# Patient Record
Sex: FEMALE | Race: WHITE | Employment: UNEMPLOYED | URBAN - METROPOLITAN AREA
[De-identification: names, ages, dates, MRNs, and addresses within clinical notes are randomized per-mention and may not be internally consistent; named-entity substitution may affect disease eponyms.]

---

## 2017-03-20 ENCOUNTER — OFFICE VISIT (OUTPATIENT)
Dept: OBGYN | Age: 22
End: 2017-03-20
Payer: MEDICARE

## 2017-03-20 VITALS
WEIGHT: 162 LBS | HEIGHT: 62 IN | DIASTOLIC BLOOD PRESSURE: 70 MMHG | SYSTOLIC BLOOD PRESSURE: 110 MMHG | BODY MASS INDEX: 29.81 KG/M2

## 2017-03-20 DIAGNOSIS — N93.8 DUB (DYSFUNCTIONAL UTERINE BLEEDING): Primary | ICD-10-CM

## 2017-03-20 PROCEDURE — 99213 OFFICE O/P EST LOW 20 MIN: CPT | Performed by: ADVANCED PRACTICE MIDWIFE

## 2017-04-21 ENCOUNTER — HOSPITAL ENCOUNTER (OUTPATIENT)
Age: 22
Discharge: HOME | End: 2017-04-21
Payer: MEDICARE

## 2017-04-21 DIAGNOSIS — N93.8 DUB (DYSFUNCTIONAL UTERINE BLEEDING): ICD-10-CM

## 2017-04-21 LAB — PROGESTERONE LEVEL: 0.38 NG/ML

## 2017-04-21 PROCEDURE — 84144 ASSAY OF PROGESTERONE: CPT

## 2017-04-21 PROCEDURE — 36415 COLL VENOUS BLD VENIPUNCTURE: CPT

## 2017-04-25 DIAGNOSIS — N97.9 INFERTILITY, FEMALE: Primary | ICD-10-CM

## 2017-05-06 ENCOUNTER — HOSPITAL ENCOUNTER (OUTPATIENT)
Age: 22
Discharge: HOME | End: 2017-05-06
Payer: MEDICARE

## 2017-05-06 LAB — PROGESTERONE LEVEL: 0.1 NG/ML

## 2017-05-06 PROCEDURE — 84144 ASSAY OF PROGESTERONE: CPT

## 2017-05-06 PROCEDURE — 36415 COLL VENOUS BLD VENIPUNCTURE: CPT

## 2017-08-07 ENCOUNTER — HOSPITAL ENCOUNTER (OUTPATIENT)
Age: 22
Setting detail: SPECIMEN
Discharge: HOME | End: 2017-08-07
Payer: MEDICARE

## 2017-08-07 ENCOUNTER — OFFICE VISIT (OUTPATIENT)
Dept: OBGYN | Age: 22
End: 2017-08-07
Payer: MEDICARE

## 2017-08-07 VITALS
HEIGHT: 66 IN | DIASTOLIC BLOOD PRESSURE: 76 MMHG | WEIGHT: 164 LBS | BODY MASS INDEX: 26.36 KG/M2 | SYSTOLIC BLOOD PRESSURE: 115 MMHG

## 2017-08-07 DIAGNOSIS — N91.2 AMENORRHEA: Primary | ICD-10-CM

## 2017-08-07 DIAGNOSIS — N97.0 ANOVULATORY (DYSFUNCTIONAL UTERINE) BLEEDING: ICD-10-CM

## 2017-08-07 DIAGNOSIS — N91.2 AMENORRHEA: ICD-10-CM

## 2017-08-07 LAB
CONTROL: PRESENT
HCG QUANTITATIVE: <1 IU/L
PREGNANCY TEST URINE, POC: NEGATIVE
PROGESTERONE LEVEL: 8.13 NG/ML

## 2017-08-07 PROCEDURE — 84144 ASSAY OF PROGESTERONE: CPT

## 2017-08-07 PROCEDURE — 36415 COLL VENOUS BLD VENIPUNCTURE: CPT

## 2017-08-07 PROCEDURE — 99213 OFFICE O/P EST LOW 20 MIN: CPT | Performed by: ADVANCED PRACTICE MIDWIFE

## 2017-08-07 PROCEDURE — 81025 URINE PREGNANCY TEST: CPT | Performed by: ADVANCED PRACTICE MIDWIFE

## 2017-08-07 PROCEDURE — 84702 CHORIONIC GONADOTROPIN TEST: CPT

## 2017-08-07 ASSESSMENT — PATIENT HEALTH QUESTIONNAIRE - PHQ9
1. LITTLE INTEREST OR PLEASURE IN DOING THINGS: 0
2. FEELING DOWN, DEPRESSED OR HOPELESS: 0
SUM OF ALL RESPONSES TO PHQ9 QUESTIONS 1 & 2: 0
SUM OF ALL RESPONSES TO PHQ QUESTIONS 1-9: 0

## 2017-08-15 ENCOUNTER — TELEPHONE (OUTPATIENT)
Dept: OBGYN | Age: 22
End: 2017-08-15

## 2017-09-01 ENCOUNTER — HOSPITAL ENCOUNTER (OUTPATIENT)
Age: 22
Discharge: HOME | End: 2017-09-01
Payer: MEDICARE

## 2017-09-01 DIAGNOSIS — N97.0 ANOVULATION: ICD-10-CM

## 2017-09-01 DIAGNOSIS — N97.0 ANOVULATION: Primary | ICD-10-CM

## 2017-09-01 LAB — PROGESTERONE LEVEL: 20.98 NG/ML

## 2017-09-01 PROCEDURE — 36415 COLL VENOUS BLD VENIPUNCTURE: CPT

## 2017-09-01 PROCEDURE — 84144 ASSAY OF PROGESTERONE: CPT

## 2017-09-12 DIAGNOSIS — N97.0 ANOVULATORY (DYSFUNCTIONAL UTERINE) BLEEDING: ICD-10-CM

## 2017-09-25 ENCOUNTER — TELEPHONE (OUTPATIENT)
Dept: OBGYN | Age: 22
End: 2017-09-25

## 2017-09-25 ENCOUNTER — HOSPITAL ENCOUNTER (OUTPATIENT)
Age: 22
Setting detail: SPECIMEN
Discharge: HOME | End: 2017-09-25
Payer: MEDICARE

## 2017-09-25 DIAGNOSIS — N97.0 ANOVULATORY CYCLE: Primary | ICD-10-CM

## 2017-10-03 ENCOUNTER — HOSPITAL ENCOUNTER (OUTPATIENT)
Age: 22
Discharge: HOME | End: 2017-10-03
Payer: MEDICARE

## 2017-10-03 DIAGNOSIS — N97.0 ANOVULATORY CYCLE: ICD-10-CM

## 2017-10-03 LAB — PROGESTERONE LEVEL: 26.05 NG/ML

## 2017-10-03 PROCEDURE — 36415 COLL VENOUS BLD VENIPUNCTURE: CPT

## 2017-10-03 PROCEDURE — 84144 ASSAY OF PROGESTERONE: CPT

## 2017-11-15 ENCOUNTER — TELEPHONE (OUTPATIENT)
Dept: OBGYN | Age: 22
End: 2017-11-15

## 2017-11-22 ENCOUNTER — TELEPHONE (OUTPATIENT)
Dept: OBGYN CLINIC | Age: 22
End: 2017-11-22

## 2017-11-22 DIAGNOSIS — R10.2 PELVIC PAIN AFFECTING PREGNANCY IN FIRST TRIMESTER, ANTEPARTUM: Primary | ICD-10-CM

## 2017-11-22 DIAGNOSIS — O20.9 VAGINAL BLEEDING IN PREGNANCY, FIRST TRIMESTER: ICD-10-CM

## 2017-11-22 DIAGNOSIS — O26.891 PELVIC PAIN AFFECTING PREGNANCY IN FIRST TRIMESTER, ANTEPARTUM: Primary | ICD-10-CM

## 2018-01-08 ENCOUNTER — HOSPITAL ENCOUNTER (OUTPATIENT)
Age: 23
Setting detail: SPECIMEN
Discharge: HOME | End: 2018-01-08
Payer: COMMERCIAL

## 2018-01-08 ENCOUNTER — INITIAL PRENATAL (OUTPATIENT)
Dept: OBGYN | Age: 23
End: 2018-01-08
Payer: COMMERCIAL

## 2018-01-08 VITALS — DIASTOLIC BLOOD PRESSURE: 76 MMHG | SYSTOLIC BLOOD PRESSURE: 110 MMHG | BODY MASS INDEX: 26.31 KG/M2 | WEIGHT: 163 LBS

## 2018-01-08 DIAGNOSIS — Z3A.01 6 WEEKS GESTATION OF PREGNANCY: ICD-10-CM

## 2018-01-08 DIAGNOSIS — O21.9 NAUSEA AND VOMITING IN PREGNANCY: Primary | ICD-10-CM

## 2018-01-08 DIAGNOSIS — O36.80X0 PREGNANCY WITH UNCERTAIN FETAL VIABILITY, SINGLE OR UNSPECIFIED FETUS: ICD-10-CM

## 2018-01-08 DIAGNOSIS — Z34.91 ENCOUNTER FOR SUPERVISION OF NORMAL PREGNANCY IN FIRST TRIMESTER, UNSPECIFIED GRAVIDITY: ICD-10-CM

## 2018-01-08 LAB
ABO/RH: NORMAL
AMPHETAMINE SCREEN URINE: NEGATIVE
ANTIBODY SCREEN: NEGATIVE
BARBITURATE SCREEN URINE: NEGATIVE
BENZODIAZEPINE SCREEN, URINE: NEGATIVE
BUPRENORPHINE URINE: NEGATIVE
CANNABINOID SCREEN URINE: NEGATIVE
COCAINE METABOLITE, URINE: NEGATIVE
MDMA URINE: NORMAL
METHADONE SCREEN, URINE: NEGATIVE
METHAMPHETAMINE, URINE: NEGATIVE
OPIATES, URINE: NEGATIVE
OXYCODONE SCREEN URINE: NEGATIVE
PHENCYCLIDINE, URINE: NEGATIVE
PROPOXYPHENE, URINE: NEGATIVE
TEST INFORMATION: NORMAL
TRICYCLIC ANTIDEPRESSANTS, UR: NEGATIVE

## 2018-01-08 PROCEDURE — 87591 N.GONORRHOEAE DNA AMP PROB: CPT

## 2018-01-08 PROCEDURE — 86762 RUBELLA ANTIBODY: CPT

## 2018-01-08 PROCEDURE — 86850 RBC ANTIBODY SCREEN: CPT

## 2018-01-08 PROCEDURE — 86780 TREPONEMA PALLIDUM: CPT

## 2018-01-08 PROCEDURE — 86803 HEPATITIS C AB TEST: CPT

## 2018-01-08 PROCEDURE — 80306 DRUG TEST PRSMV INSTRMNT: CPT

## 2018-01-08 PROCEDURE — 87340 HEPATITIS B SURFACE AG IA: CPT

## 2018-01-08 PROCEDURE — 76817 TRANSVAGINAL US OBSTETRIC: CPT | Performed by: OBSTETRICS & GYNECOLOGY

## 2018-01-08 PROCEDURE — 87389 HIV-1 AG W/HIV-1&-2 AB AG IA: CPT

## 2018-01-08 PROCEDURE — 87491 CHLMYD TRACH DNA AMP PROBE: CPT

## 2018-01-08 PROCEDURE — 86900 BLOOD TYPING SEROLOGIC ABO: CPT

## 2018-01-08 PROCEDURE — 86901 BLOOD TYPING SEROLOGIC RH(D): CPT

## 2018-01-08 PROCEDURE — 99211 OFF/OP EST MAY X REQ PHY/QHP: CPT | Performed by: ADVANCED PRACTICE MIDWIFE

## 2018-01-08 PROCEDURE — 87086 URINE CULTURE/COLONY COUNT: CPT

## 2018-01-08 PROCEDURE — 85025 COMPLETE CBC W/AUTO DIFF WBC: CPT

## 2018-01-08 RX ORDER — ONDANSETRON 4 MG/1
4 TABLET, FILM COATED ORAL EVERY 8 HOURS PRN
Qty: 20 TABLET | Refills: 2 | Status: ON HOLD | OUTPATIENT
Start: 2018-01-08 | End: 2018-08-10 | Stop reason: HOSPADM

## 2018-01-08 ASSESSMENT — PATIENT HEALTH QUESTIONNAIRE - PHQ9
SUM OF ALL RESPONSES TO PHQ9 QUESTIONS 1 & 2: 0
SUM OF ALL RESPONSES TO PHQ QUESTIONS 1-9: 0
2. FEELING DOWN, DEPRESSED OR HOPELESS: 0
1. LITTLE INTEREST OR PLEASURE IN DOING THINGS: 0

## 2018-01-09 LAB
ABSOLUTE EOS #: 0.1 K/UL (ref 0–0.4)
ABSOLUTE IMMATURE GRANULOCYTE: ABNORMAL K/UL (ref 0–0.3)
ABSOLUTE LYMPH #: 1.6 K/UL (ref 1–4.8)
ABSOLUTE MONO #: 0.6 K/UL (ref 0–1)
BASOPHILS # BLD: 0 % (ref 0–2)
BASOPHILS ABSOLUTE: 0 K/UL (ref 0–0.2)
CULTURE: NORMAL
CULTURE: NORMAL
DIFFERENTIAL TYPE: ABNORMAL
EOSINOPHILS RELATIVE PERCENT: 1 % (ref 0–8)
HCT VFR BLD CALC: 37.1 % (ref 36–46)
HEMOGLOBIN: 12 G/DL (ref 12–16)
HEPATITIS B SURFACE ANTIGEN: NONREACTIVE
HEPATITIS C ANTIBODY: NONREACTIVE
HIV AG/AB: NONREACTIVE
IMMATURE GRANULOCYTES: ABNORMAL %
LYMPHOCYTES # BLD: 15 % (ref 24–44)
Lab: NORMAL
MCH RBC QN AUTO: 27.3 PG (ref 26–34)
MCHC RBC AUTO-ENTMCNC: 32.5 G/DL (ref 31–37)
MCV RBC AUTO: 84.2 FL (ref 80–100)
MONOCYTES # BLD: 6 % (ref 0–12)
PDW BLD-RTO: 14.4 % (ref 12.1–15.2)
PLATELET # BLD: 277 K/UL (ref 140–450)
PLATELET ESTIMATE: ABNORMAL
PMV BLD AUTO: 8.8 FL (ref 6–12)
RBC # BLD: 4.4 M/UL (ref 4–5.2)
RBC # BLD: ABNORMAL 10*6/UL
RUBV IGG SER QL: 139.6 IU/ML
SEG NEUTROPHILS: 78 % (ref 36–66)
SEGMENTED NEUTROPHILS ABSOLUTE COUNT: 8.4 K/UL (ref 1.8–7.7)
SPECIMEN DESCRIPTION: NORMAL
SPECIMEN DESCRIPTION: NORMAL
STATUS: NORMAL
T. PALLIDUM, IGG: NONREACTIVE
WBC # BLD: 10.8 K/UL (ref 3.5–11)
WBC # BLD: ABNORMAL 10*3/UL

## 2018-01-10 LAB
C. TRACHOMATIS DNA ,URINE: NEGATIVE
N. GONORRHOEAE DNA, URINE: NEGATIVE

## 2018-01-22 ENCOUNTER — HOSPITAL ENCOUNTER (OUTPATIENT)
Age: 23
Setting detail: SPECIMEN
Discharge: HOME | End: 2018-01-22
Payer: COMMERCIAL

## 2018-01-22 ENCOUNTER — ROUTINE PRENATAL (OUTPATIENT)
Dept: OBGYN | Age: 23
End: 2018-01-22
Payer: COMMERCIAL

## 2018-01-22 VITALS — BODY MASS INDEX: 26.05 KG/M2 | DIASTOLIC BLOOD PRESSURE: 74 MMHG | WEIGHT: 161.4 LBS | SYSTOLIC BLOOD PRESSURE: 112 MMHG

## 2018-01-22 DIAGNOSIS — Z3A.08 8 WEEKS GESTATION OF PREGNANCY: ICD-10-CM

## 2018-01-22 DIAGNOSIS — Z34.91 ENCOUNTER FOR SUPERVISION OF NORMAL PREGNANCY IN FIRST TRIMESTER, UNSPECIFIED GRAVIDITY: Primary | ICD-10-CM

## 2018-01-22 DIAGNOSIS — Z34.91 ENCOUNTER FOR SUPERVISION OF NORMAL PREGNANCY IN FIRST TRIMESTER, UNSPECIFIED GRAVIDITY: ICD-10-CM

## 2018-01-22 PROCEDURE — G0145 SCR C/V CYTO,THINLAYER,RESCR: HCPCS

## 2018-01-22 PROCEDURE — G8417 CALC BMI ABV UP PARAM F/U: HCPCS | Performed by: ADVANCED PRACTICE MIDWIFE

## 2018-01-22 PROCEDURE — G8484 FLU IMMUNIZE NO ADMIN: HCPCS | Performed by: ADVANCED PRACTICE MIDWIFE

## 2018-01-22 PROCEDURE — G8427 DOCREV CUR MEDS BY ELIG CLIN: HCPCS | Performed by: ADVANCED PRACTICE MIDWIFE

## 2018-01-22 PROCEDURE — 1036F TOBACCO NON-USER: CPT | Performed by: ADVANCED PRACTICE MIDWIFE

## 2018-01-22 PROCEDURE — 99213 OFFICE O/P EST LOW 20 MIN: CPT | Performed by: ADVANCED PRACTICE MIDWIFE

## 2018-01-22 RX ORDER — VITAMIN A ACETATE, .BETA.-CAROTENE, ASCORBIC ACID, CHOLECALCIFEROL, .ALPHA.-TOCOPHEROL ACETATE, DL-, THIAMINE MONONITRATE, RIBOFLAVIN, NIACINAMIDE, PYRIDOXINE HYDROCHLORIDE, FOLIC ACID, CYANOCOBALAMIN, CALCIUM CARBONATE, FERROUS FUMARATE, ZINC OXIDE, AND CUPRIC OXIDE 2000; 2000; 120; 400; 22; 1.84; 3; 20; 10; 1; 12; 200; 27; 25; 2 [IU]/1; [IU]/1; MG/1; [IU]/1; MG/1; MG/1; MG/1; MG/1; MG/1; MG/1; UG/1; MG/1; MG/1; MG/1; MG/1
TABLET ORAL
COMMUNITY
Start: 2018-01-09

## 2018-01-22 NOTE — PROGRESS NOTES
look for chromosomal problems in the baby's cells. It can be done between 15 and 20 weeks of pregnancy, usually around week 16.  ¨ Nuchal translucency test. This test uses ultrasound to measure the thickness of the area at the back of the baby's neck. An increase in the thickness can be an early sign of Down syndrome. ¨ Chorionic villus sampling (CVS). This is a test that looks for certain genetic problems with your baby. The same genes that are in your baby are in the placenta. A small piece of the placenta is taken out and tested. This test is done when you are 10 to 13 weeks pregnant. Ease discomfort  · Slow down and take naps when you feel tired. · If your emotions swing, talk to someone. Crying, anxiety, and concentration problems are common. · If your gums bleed, try a softer toothbrush. If your gums are puffy and bleed a lot, see your dentist.  · If you feel dizzy:  ¨ Get up slowly after sitting or lying down. ¨ Drink plenty of fluids. ¨ Eat small snacks to keep your blood sugar stable. ¨ Put your head between your legs as though you were tying your shoelaces. ¨ Lie down with your legs higher than your head. Use pillows to prop up your feet. · If you have a headache:  ¨ Lie down. ¨ Ask your partner or a good friend for a neck massage. ¨ Try cool cloths over your forehead or across the back of your neck. ¨ Use acetaminophen (Tylenol) for pain relief. Do not use nonsteroidal anti-inflammatory drugs (NSAIDs), such as ibuprofen (Advil, Motrin) or naproxen (Aleve), unless your doctor says it is okay. · If you have a nosebleed, pinch your nose gently, and hold it for a short while. To prevent nosebleeds, try massaging a small dab of petroleum jelly, such as Vaseline, in your nostrils. · If your nose is stuffed up, try saline (saltwater) nose sprays. Do not use decongestant sprays. Care for your breasts  · Wear a bra that gives you good support.   · Know that changes in your breasts are normal.  ¨ Your

## 2018-01-22 NOTE — PATIENT INSTRUCTIONS
petroleum jelly, such as Vaseline, in your nostrils. · If your nose is stuffed up, try saline (saltwater) nose sprays. Do not use decongestant sprays. Care for your breasts  · Wear a bra that gives you good support. · Know that changes in your breasts are normal.  ¨ Your breasts may get larger and more tender. Tenderness usually gets better by 12 weeks. ¨ Your nipples may get darker and larger, and small bumps around your nipples may show more. ¨ The veins in your chest and breasts may show more. · Don't worry about \"toughening'\" your nipples. Breastfeeding will naturally do this. Where can you learn more? Go to https://import2peThe [employer].[place] [employer]. org and sign in to your [employer] account. Enter H899 in the vidCoin box to learn more about \"Weeks 10 to 14 of Your Pregnancy: Care Instructions. \"     If you do not have an account, please click on the \"Sign Up Now\" link. Current as of: March 16, 2017  Content Version: 11.5  © 3651-5993 Healthwise, Incorporated. Care instructions adapted under license by [facility] ([facility]). If you have questions about a medical condition or this instruction, always ask your healthcare professional. [address] any warranty or liability for your use of this information.

## 2018-01-31 LAB — CYTOLOGY REPORT: NORMAL

## 2018-02-19 ENCOUNTER — ROUTINE PRENATAL (OUTPATIENT)
Dept: OBGYN | Age: 23
End: 2018-02-19

## 2018-02-19 VITALS — SYSTOLIC BLOOD PRESSURE: 112 MMHG | WEIGHT: 160.4 LBS | DIASTOLIC BLOOD PRESSURE: 68 MMHG | BODY MASS INDEX: 25.89 KG/M2

## 2018-02-19 DIAGNOSIS — Z87.59 HISTORY OF MISCARRIAGE: ICD-10-CM

## 2018-02-19 DIAGNOSIS — O26.891 PELVIC PAIN AFFECTING PREGNANCY IN FIRST TRIMESTER, ANTEPARTUM: ICD-10-CM

## 2018-02-19 DIAGNOSIS — R10.2 PELVIC PAIN AFFECTING PREGNANCY IN FIRST TRIMESTER, ANTEPARTUM: ICD-10-CM

## 2018-02-19 DIAGNOSIS — O20.9 VAGINAL BLEEDING IN PREGNANCY, FIRST TRIMESTER: ICD-10-CM

## 2018-02-19 DIAGNOSIS — Z3A.12 12 WEEKS GESTATION OF PREGNANCY: Primary | ICD-10-CM

## 2018-02-19 PROCEDURE — 0502F SUBSEQUENT PRENATAL CARE: CPT | Performed by: ADVANCED PRACTICE MIDWIFE

## 2018-02-19 NOTE — PATIENT INSTRUCTIONS
Patient Education        Weeks 14 to 25 of Your Pregnancy: Care Instructions  Your Care Instructions    During this time, you may start to \"show,\" so that you look pregnant to people around you. You may also notice some changes in your skin, such as itchy spots on your palms or acne on your face. Your baby is now able to pass urine, and your baby's first stool (meconium) is starting to collect in his or her intestines. Hair is also beginning to grow on your baby's head. At your next visit, between weeks 18 and 20, your doctor may do an ultrasound test. The test allows your doctor to check for certain problems. Your doctor can also tell the sex of your baby. This is a good time to think about whether you want to know whether your baby is a boy or a girl. Talk to your doctor about getting a flu shot to help keep you healthy during your pregnancy. As your pregnancy moves along, it is common to worry or feel anxious. Your body is changing a lot. And you are thinking about giving birth, the health of your baby, and becoming a parent. You can learn to cope with any anxiety and stress you feel. Follow-up care is a key part of your treatment and safety. Be sure to make and go to all appointments, and call your doctor if you are having problems. It's also a good idea to know your test results and keep a list of the medicines you take. How can you care for yourself at home? ?Reduce stress  ? · Ask for help with cooking and housekeeping. ? · Figure out who or what causes your stress. Avoid these people or situations as much as possible. ? · Relax every day. Taking 10- to 15-minute breaks can make a big difference. Take a walk, listen to music, or take a warm bath. ? · Learn relaxation techniques at prenatal or yoga class. Or buy a relaxation tape. ? · List your fears about having a baby and becoming a parent. Share the list with someone you trust. Decide which worries are really small, and try to let them go.

## 2018-02-19 NOTE — PROGRESS NOTES
gymnastics, and water skiing, are not a good idea. ? · Try to do at least 2½ hours a week of moderate exercise, such as a fast walk. One way to do this is to be active 30 minutes a day, at least 5 days a week. It's fine to be active in blocks of 10 minutes or more throughout your day and week. ? · Wear loose clothing. And wear shoes and a bra that provide good support. ? · Warm up and cool down to start and finish your exercise. ? · If you want to use weights, be sure to use light weights. They reduce stress on your joints. ?Stay at the best weight for you  ? · Experts recommend that you gain about 1 pound a month during the first 3 months of your pregnancy. ? · Experts recommend that you gain about 1 pound a week during your last 6 months of pregnancy, for a total weight gain of 25 to 35 pounds. ? · If you are underweight, you will need to gain more weight (about 28 to 40 pounds). ? · If you are overweight, you may not need to gain as much weight (about 15 to 25 pounds). ? · If you are gaining weight too fast, use common sense. Exercise every day, and limit sweets, fast foods, and fats. Choose lean meats, fruits, and vegetables. ? · If you are having twins or more, your doctor may refer you to a dietitian. Where can you learn more? Go to https://Skylinesivelisse[employer].TekTrak. org and sign in to your [employer] account. Enter S205 in the [facility] box to learn more about \"Weeks 14 to 18 of Your Pregnancy: Care Instructions. \"     If you do not have an account, please click on the \"Sign Up Now\" link. Current as of: March 16, 2017  Content Version: 11.5  © 4951-1805 Healthwise, [employer]. Care instructions adapted under license by [facility] ([facility]). If you have questions about a medical condition or this instruction, always ask your healthcare professional. Jo-Ann Eli any warranty or liability for your use of this information.        Patient Education        Mervat Moody 18 to 22 of Your Pregnancy: Care Instructions  Your Care Instructions    Your baby is continuing to develop quickly. At this stage, babies can now suck their thumbs,  firmly with their hands, and open and close their eyelids. Sometime between 18 and 22 weeks, you will start to feel your baby move. At first, these small fetal movements feel like fluttering or \"butterflies. \" Some women say that they feel like gas bubbles. As the baby grows, these movements will become stronger. You may also notice that your baby kicks and hiccups. During this time, you may find that your nausea and fatigue are gone. Overall, you may feel better and have more energy than you did in your first trimester. But you may also have new discomforts now, such as sleep problems or leg cramps. This care sheet can help you ease these discomforts. Follow-up care is a key part of your treatment and safety. Be sure to make and go to all appointments, and call your doctor if you are having problems. It's also a good idea to know your test results and keep a list of the medicines you take. How can you care for yourself at home? Ease sleep problems  · Avoid caffeine in drinks or chocolate late in the day. · Get some exercise every day. · Take a warm shower or bath before bed. · Have a light snack or glass of milk at bedtime. · Do relaxation exercises in bed to calm your mind and body. · Support your legs and back with extra pillows. Try a pillow between your legs if you sleep on your side. · Do not use sleeping pills or alcohol. They could harm your baby. Ease leg cramps  · Do not massage your calf during the cramp. · Sit on a firm bed or chair. Straighten your leg, and bend your foot (flex your ankle) slowly upward, toward your knee. Bend your toes up and down. · Stand on a cool, flat surface. Stretch your toes upward, and take small steps walking on your heels.   · Use a heating pad or hot water bottle to help with muscle worse if your stomach is empty. Eat five or six small meals a day instead of three large meals. · For morning nausea, eat a small snack, such as a couple of crackers or dry biscuits, before rising. Allow a few minutes for your stomach to settle before you get out of bed slowly. · Drink plenty of fluids, enough so that your urine is light yellow or clear like water. If you have kidney, heart, or liver disease and have to limit fluids, talk with your doctor before you increase the amount of fluids you drink. Some women find that peppermint tea helps with nausea. · Eat more protein, such as chicken, fish, lean meat, beans, nuts, and seeds. · Eat carbohydrate foods, such as potatoes, whole-grain cereals, rice, and pasta. · Avoid smells and foods that make you feel nauseated. Spicy or high-fat foods, citrus juice, milk, coffee, and tea with caffeine often make nausea worse. · Do not drink alcohol. · Do not smoke. Try not to be around others who smoke. If you need help quitting, talk to your doctor about stop-smoking programs and medicines. These can increase your chances of quitting for good. · If you are taking iron supplements, ask your doctor if they are necessary. Iron can make nausea worse. · Get lots of rest. Stress and fatigue can make your morning sickness worse. · Ask your doctor about taking prescription medicine, or over-the-counter products such as vitamin B6, doxylamine, or ninfa, to relieve your symptoms. Your doctor can tell you the doses that are safe for you. · Take your prenatal vitamins at night on a full stomach. When should you call for help? Call 911 anytime you think you may need emergency care. For example, call if:  ? · You passed out (lost consciousness). ?Call your doctor now or seek immediate medical care if:  ? · You are sick to your stomach or cannot drink fluids. ? · You have symptoms of dehydration, such as:  ¨ Dry eyes and a dry mouth.   ¨ Passing only a little

## 2018-03-13 ENCOUNTER — TELEPHONE (OUTPATIENT)
Dept: OBGYN | Age: 23
End: 2018-03-13

## 2018-03-13 NOTE — TELEPHONE ENCOUNTER
Pt stated that she has a chip in her tooth causing her pain, she is 16 wks pregnant. Would like to know if she can get xrays and fillings and whatever else they may need to do while being pregnant? Please advise.

## 2018-03-14 NOTE — TELEPHONE ENCOUNTER
Called patient and left message stating that a letter is ready at  with juan carlos for . The letter explains what she can have done while pregnant.

## 2018-03-20 ENCOUNTER — ROUTINE PRENATAL (OUTPATIENT)
Dept: OBGYN | Age: 23
End: 2018-03-20

## 2018-03-20 VITALS — DIASTOLIC BLOOD PRESSURE: 68 MMHG | BODY MASS INDEX: 25.95 KG/M2 | SYSTOLIC BLOOD PRESSURE: 124 MMHG | WEIGHT: 160.8 LBS

## 2018-03-20 DIAGNOSIS — Z87.59 HISTORY OF MISCARRIAGE: ICD-10-CM

## 2018-03-20 DIAGNOSIS — Z3A.16 16 WEEKS GESTATION OF PREGNANCY: Primary | ICD-10-CM

## 2018-03-20 PROCEDURE — 0502F SUBSEQUENT PRENATAL CARE: CPT | Performed by: ADVANCED PRACTICE MIDWIFE

## 2018-03-20 NOTE — PROGRESS NOTES
Violeta Morrison is here at [place] [address] for:    Chief Complaint   Patient presents with    Routine Prenatal Visit     patient found out she is having a male from the free fetal DNA testing. finally getting over the morning sickness, feeling well. Estimated Due Date: Estimated Date of Delivery: 18    OB History    Para Term  AB Living   2 0 0 0 1 0   SAB TAB Ectopic Molar Multiple Live Births   1 0 0   0        # Outcome Date GA Lbr Oleksandr/2nd Weight Sex Delivery Anes PTL Lv   2 Current            1 SAB 17              Obstetric Comments                   No past medical history on file. No past surgical history on file. History   Smoking Status    Never Smoker   Smokeless Tobacco    Never Used        History   Alcohol Use No       No results found for this visit on 18. Vitals:  /68   Wt 160 lb 12.8 oz (72.9 kg)   LMP 2017   BMI 25.95 kg/m²   Estimated body mass index is 25.95 kg/m² as calculated from the following:    Height as of 17: 5' 6\" (1.676 m). Weight as of this encounter: 160 lb 12.8 oz (72.9 kg). Labs:    No results found for this visit on 18. HPI: pt doing well today. Denies needs states she is feling better but has some low back pain      Yes PT denies fever, chills, nausea and vomiting       Abdomen: enlarged, soft     See prenatal vital sign section and fetal assessment section    ASSESSMENT & Plan   1. 16 weeks gestation of pregnancy     2. History of miscarriage               I am having Ms. Meeks maintain her PRENATAL 1, ondansetron, and PNV PRENATAL PLUS MULTIVITAMIN. Return in about 3 weeks (around 4/10/2018) for OB 20 wk u/s. Patient Instructions       Patient Education        Weeks 14 to 25 of Your Pregnancy: Care Instructions  Your Care Instructions    During this time, you may start to \"show,\" so that you look pregnant to people around you.  You may also notice some changes in your skin, such as itchy spots on your palms or acne on your face. Your baby is now able to pass urine, and your baby's first stool (meconium) is starting to collect in his or her intestines. Hair is also beginning to grow on your baby's head. At your next visit, between weeks 18 and 20, your doctor may do an ultrasound test. The test allows your doctor to check for certain problems. Your doctor can also tell the sex of your baby. This is a good time to think about whether you want to know whether your baby is a boy or a girl. Talk to your doctor about getting a flu shot to help keep you healthy during your pregnancy. As your pregnancy moves along, it is common to worry or feel anxious. Your body is changing a lot. And you are thinking about giving birth, the health of your baby, and becoming a parent. You can learn to cope with any anxiety and stress you feel. Follow-up care is a key part of your treatment and safety. Be sure to make and go to all appointments, and call your doctor if you are having problems. It's also a good idea to know your test results and keep a list of the medicines you take. How can you care for yourself at home? ?Reduce stress  ? · Ask for help with cooking and housekeeping. ? · Figure out who or what causes your stress. Avoid these people or situations as much as possible. ? · Relax every day. Taking 10- to 15-minute breaks can make a big difference. Take a walk, listen to music, or take a warm bath. ? · Learn relaxation techniques at prenatal or yoga class. Or buy a relaxation tape. ? · List your fears about having a baby and becoming a parent. Share the list with someone you trust. Decide which worries are really small, and try to let them go. Exercise  ? · If you did not exercise much before pregnancy, start slowly. Walking is best. Raj Varelaans yourself, and do a little more every day. ? · Brisk walking, easy jogging, low-impact aerobics, water aerobics, and yoga are good choices.  Some sports, such as scuba

## 2018-04-17 ENCOUNTER — ROUTINE PRENATAL (OUTPATIENT)
Dept: OBGYN | Age: 23
End: 2018-04-17
Payer: COMMERCIAL

## 2018-04-17 VITALS — BODY MASS INDEX: 26.86 KG/M2 | WEIGHT: 166.4 LBS | DIASTOLIC BLOOD PRESSURE: 86 MMHG | SYSTOLIC BLOOD PRESSURE: 118 MMHG

## 2018-04-17 DIAGNOSIS — Z34.82 ENCOUNTER FOR SUPERVISION OF OTHER NORMAL PREGNANCY IN SECOND TRIMESTER: ICD-10-CM

## 2018-04-17 DIAGNOSIS — Z36.3 ENCOUNTER FOR ANTENATAL SCREENING FOR MALFORMATION USING ULTRASOUND: Primary | ICD-10-CM

## 2018-04-17 DIAGNOSIS — Z3A.20 20 WEEKS GESTATION OF PREGNANCY: ICD-10-CM

## 2018-04-17 LAB
ABDOMINAL CIRCUMFERENCE: 17.58 CM
BIPARIETAL DIAMETER: 4.83 CM
ESTIMATED FETAL WEIGHT: 451 GRAMS
FEMORAL DIAMETER: NORMAL CM
FEMORAL LENGTH: 3.59 CM
HC/AC: 1.03
HEAD CIRCUMFERENCE: 18.19 CM

## 2018-04-17 PROCEDURE — 0502F SUBSEQUENT PRENATAL CARE: CPT | Performed by: ADVANCED PRACTICE MIDWIFE

## 2018-04-17 PROCEDURE — 76805 OB US >/= 14 WKS SNGL FETUS: CPT | Performed by: OBSTETRICS & GYNECOLOGY

## 2018-05-10 ENCOUNTER — ROUTINE PRENATAL (OUTPATIENT)
Dept: OBGYN | Age: 23
End: 2018-05-10

## 2018-05-10 VITALS — BODY MASS INDEX: 27.66 KG/M2 | WEIGHT: 171.4 LBS | DIASTOLIC BLOOD PRESSURE: 84 MMHG | SYSTOLIC BLOOD PRESSURE: 130 MMHG

## 2018-05-10 DIAGNOSIS — Z34.82 ENCOUNTER FOR SUPERVISION OF OTHER NORMAL PREGNANCY IN SECOND TRIMESTER: ICD-10-CM

## 2018-05-10 DIAGNOSIS — Z3A.24 24 WEEKS GESTATION OF PREGNANCY: Primary | ICD-10-CM

## 2018-05-10 PROCEDURE — 0502F SUBSEQUENT PRENATAL CARE: CPT | Performed by: ADVANCED PRACTICE MIDWIFE

## 2018-05-21 ENCOUNTER — TELEPHONE (OUTPATIENT)
Dept: OBGYN | Age: 23
End: 2018-05-21

## 2018-06-19 ENCOUNTER — ROUTINE PRENATAL (OUTPATIENT)
Dept: OBGYN | Age: 23
End: 2018-06-19
Payer: COMMERCIAL

## 2018-06-19 VITALS — WEIGHT: 183 LBS | BODY MASS INDEX: 29.54 KG/M2 | SYSTOLIC BLOOD PRESSURE: 126 MMHG | DIASTOLIC BLOOD PRESSURE: 86 MMHG

## 2018-06-19 DIAGNOSIS — O26.843 UTERINE SIZE-DATE DISCREPANCY IN THIRD TRIMESTER: ICD-10-CM

## 2018-06-19 DIAGNOSIS — Z3A.29 29 WEEKS GESTATION OF PREGNANCY: Primary | ICD-10-CM

## 2018-06-19 LAB
ABDOMINAL CIRCUMFERENCE: 27.9 CM
BIPARIETAL DIAMETER: 7.76 CM
ESTIMATED FETAL WEIGHT: 1786 GRAMS
FEMORAL DIAMETER: NORMAL CM
GLUCOSE BLD-MCNC: 128 MG/DL
GLUCOSE TOLERANCE SCREEN 50G: 128
HC/AC: 1
HEAD CIRCUMFERENCE: 27.8 CM
HGB, POC: 11
HGB: 11

## 2018-06-19 PROCEDURE — 76816 OB US FOLLOW-UP PER FETUS: CPT | Performed by: OBSTETRICS & GYNECOLOGY

## 2018-06-19 PROCEDURE — 0502F SUBSEQUENT PRENATAL CARE: CPT | Performed by: ADVANCED PRACTICE MIDWIFE

## 2018-07-05 ENCOUNTER — ROUTINE PRENATAL (OUTPATIENT)
Dept: OBGYN | Age: 23
End: 2018-07-05

## 2018-07-05 VITALS — SYSTOLIC BLOOD PRESSURE: 124 MMHG | DIASTOLIC BLOOD PRESSURE: 82 MMHG | BODY MASS INDEX: 30.02 KG/M2 | WEIGHT: 186 LBS

## 2018-07-05 DIAGNOSIS — Z3A.32 32 WEEKS GESTATION OF PREGNANCY: Primary | ICD-10-CM

## 2018-07-05 DIAGNOSIS — Z34.83 ENCOUNTER FOR SUPERVISION OF OTHER NORMAL PREGNANCY IN THIRD TRIMESTER: ICD-10-CM

## 2018-07-05 PROCEDURE — 0502F SUBSEQUENT PRENATAL CARE: CPT | Performed by: ADVANCED PRACTICE MIDWIFE

## 2018-07-05 NOTE — PROGRESS NOTES
Heide Mendes is here at [address] [facility] for:    Chief Complaint   Patient presents with   Ghazal Roberts Routine Prenatal Visit       Estimated Due Date: Estimated Date of Delivery: 18    OB History    Para Term  AB Living   2 0 0 0 1 0   SAB TAB Ectopic Molar Multiple Live Births   1 0 0   0        # Outcome Date GA Lbr Oleksandr/2nd Weight Sex Delivery Anes PTL Lv   2 Current            1 SAB 17              Obstetric Comments                   History reviewed. No pertinent past medical history. History reviewed. No pertinent surgical history. History   Smoking Status    Never Smoker   Smokeless Tobacco    Never Used        History   Alcohol Use No       No results found for this visit on 18. Vitals:  /82   Wt 186 lb (84.4 kg)   LMP 2017   BMI 30.02 kg/m²   Estimated body mass index is 30.02 kg/m² as calculated from the following:    Height as of 17: 5' 6\" (1.676 m). Weight as of this encounter: 186 lb (84.4 kg). Labs:    No results found for this visit on 18. HPI: pt states she is ok but hot all the time. Yes PT denies fever, chills, nausea and vomiting       Abdomen: enlarged, soft     See prenatal vital sign section and fetal assessment section    ASSESSMENT & Plan    Diagnosis Orders   1. 32 weeks gestation of pregnancy     2. Encounter for supervision of other normal pregnancy in third trimester               I am having Ms. Nicko Villa maintain her PRENATAL 1, ondansetron, and PNV PRENATAL PLUS MULTIVITAMIN. Return in about 2 weeks (around 2018) for OB.     Patient Instructions         Weekend Classes                      Class is held Friday from 6:30 pm  9:30 pm    and Saturday from 9 am  4 pm     and  -  &  -  and  -  and                    Weekly Classes    Classes are held from 6:30 pm  9:30 pm  , , and ,  and  Thursdays August 1rd, 8th, and 15th Wednesdays November 5th,12th,19th        There is no charge for classes. Please bring a pillow to class. Bring a support person.       To sign up for classes  call the Obstetrics Department at  [facility] at  [address] 11:32 AM

## 2018-07-17 ENCOUNTER — ROUTINE PRENATAL (OUTPATIENT)
Dept: OBGYN | Age: 23
End: 2018-07-17

## 2018-07-17 VITALS — BODY MASS INDEX: 30.7 KG/M2 | DIASTOLIC BLOOD PRESSURE: 82 MMHG | WEIGHT: 190.2 LBS | SYSTOLIC BLOOD PRESSURE: 138 MMHG

## 2018-07-17 DIAGNOSIS — K08.89 TOOTH PAIN: ICD-10-CM

## 2018-07-17 DIAGNOSIS — Z34.83 ENCOUNTER FOR SUPERVISION OF OTHER NORMAL PREGNANCY IN THIRD TRIMESTER: ICD-10-CM

## 2018-07-17 DIAGNOSIS — Z3A.33 33 WEEKS GESTATION OF PREGNANCY: Primary | ICD-10-CM

## 2018-07-17 PROCEDURE — 0502F SUBSEQUENT PRENATAL CARE: CPT | Performed by: ADVANCED PRACTICE MIDWIFE

## 2018-07-17 RX ORDER — AMOXICILLIN 500 MG/1
500 CAPSULE ORAL 3 TIMES DAILY
Qty: 30 CAPSULE | Refills: 0 | Status: SHIPPED | OUTPATIENT
Start: 2018-07-17 | End: 2018-07-27

## 2018-07-17 NOTE — PROGRESS NOTES
Jeanne Camacho is here at [address] [facility] for:    Chief Complaint   Patient presents with    Routine Prenatal Visit     patient is having right side tooth pain, worse at night       Estimated Due Date: Estimated Date of Delivery: 18    OB History    Para Term  AB Living   2 0 0 0 1 0   SAB TAB Ectopic Molar Multiple Live Births   1 0 0   0        # Outcome Date GA Lbr Oleksandr/2nd Weight Sex Delivery Anes PTL Lv   2 Current            1 SAB 17              Obstetric Comments                   History reviewed. No pertinent past medical history. History reviewed. No pertinent surgical history. History   Smoking Status    Never Smoker   Smokeless Tobacco    Never Used        History   Alcohol Use No       No results found for this visit on 18. Vitals:  /82   Wt 190 lb 3.2 oz (86.3 kg)   LMP 2017   BMI 30.70 kg/m²   Estimated body mass index is 30.7 kg/m² as calculated from the following:    Height as of 17: 5' 6\" (1.676 m). Weight as of this encounter: 190 lb 3.2 oz (86.3 kg). Labs:    No results found for this visit on 18. HPI: pt here today states she is doing well. No problems or concerns    Yes PT denies fever, chills, nausea and vomiting       Abdomen: enlarged, soft     See prenatal vital sign section and fetal assessment section    ASSESSMENT & Plan    Diagnosis Orders   1. 33 weeks gestation of pregnancy  amoxicillin (AMOXIL) 500 MG capsule   2. Encounter for supervision of other normal pregnancy in third trimester     3. Tooth pain  amoxicillin (AMOXIL) 500 MG capsule             I am having Ms. Brigido Vora start on amoxicillin. I am also having her maintain her PRENATAL 1, ondansetron, and PNV PRENATAL PLUS MULTIVITAMIN. Return in about 2 weeks (around 2018) for OB GBS. There are no Patient Instructions on file for this visit.           Shanae Siddiqi,2018 11:05 AM

## 2018-07-19 ENCOUNTER — TELEPHONE (OUTPATIENT)
Dept: OTHER | Age: 23
End: 2018-07-19

## 2018-07-20 NOTE — TELEPHONE ENCOUNTER
Patient prescribed amoxicillin 500 mg on Tuesday for a tooth ache. Patient has leftover medication of the same dose and wants to know if she can just take that medication and not get the new script filled. Old script filled April this year. Writer confirmed that she has a new script, the medication and dose is the same, and medication is not . Patient instructed to call the office in the morning if she has any other questions.

## 2018-07-31 ENCOUNTER — HOSPITAL ENCOUNTER (OUTPATIENT)
Age: 23
Setting detail: SPECIMEN
Discharge: HOME | End: 2018-07-31
Payer: COMMERCIAL

## 2018-07-31 ENCOUNTER — ROUTINE PRENATAL (OUTPATIENT)
Dept: OBGYN | Age: 23
End: 2018-07-31

## 2018-07-31 VITALS — BODY MASS INDEX: 31.8 KG/M2 | DIASTOLIC BLOOD PRESSURE: 86 MMHG | SYSTOLIC BLOOD PRESSURE: 132 MMHG | WEIGHT: 197 LBS

## 2018-07-31 DIAGNOSIS — Z3A.35 35 WEEKS GESTATION OF PREGNANCY: ICD-10-CM

## 2018-07-31 DIAGNOSIS — Z34.83 ENCOUNTER FOR SUPERVISION OF OTHER NORMAL PREGNANCY IN THIRD TRIMESTER: ICD-10-CM

## 2018-07-31 DIAGNOSIS — Z3A.35 35 WEEKS GESTATION OF PREGNANCY: Primary | ICD-10-CM

## 2018-07-31 PROCEDURE — 87081 CULTURE SCREEN ONLY: CPT

## 2018-07-31 PROCEDURE — 0502F SUBSEQUENT PRENATAL CARE: CPT | Performed by: ADVANCED PRACTICE MIDWIFE

## 2018-07-31 NOTE — PROGRESS NOTES
Blessing Lee is here at 35w6d for:    Chief Complaint   Patient presents with    Routine Prenatal Visit     GBS- no complaints. Estimated Due Date: Estimated Date of Delivery: 18    OB History    Para Term  AB Living   2 0 0 0 1 0   SAB TAB Ectopic Molar Multiple Live Births   1 0 0   0        # Outcome Date GA Lbr Oleksandr/2nd Weight Sex Delivery Anes PTL Lv   2 Current            1 SAB 17              Obstetric Comments                   History reviewed. No pertinent past medical history. History reviewed. No pertinent surgical history. History   Smoking Status    Never Smoker   Smokeless Tobacco    Never Used        History   Alcohol Use No       No results found for this visit on 18. Vitals:  /86   Wt 197 lb (89.4 kg)   LMP 2017   BMI 31.80 kg/m²   Estimated body mass index is 31.8 kg/m² as calculated from the following:    Height as of 17: 5' 6\" (1.676 m). Weight as of this encounter: 197 lb (89.4 kg). Labs:    No results found for this visit on 18. HPI: pt here today  states she is very tired and achy. Yes PT denies fever, chills, nausea and vomiting       Abdomen: enlarged, soft     See prenatal vital sign section and fetal assessment section    ASSESSMENT & Plan    Diagnosis Orders   1. 35 weeks gestation of pregnancy  Strep B Screen, Vaginal / Rectal   2. Encounter for supervision of other normal pregnancy in third trimester  Strep B Screen, Vaginal / Rectal             I am having Ms. Haris Mustafa maintain her PRENATAL 1, ondansetron, and PNV PRENATAL PLUS MULTIVITAMIN. Return in about 1 week (around 2018) for OB X 4. There are no Patient Instructions on file for this visit.           Dick Siddiqi,2018 10:59 AM

## 2018-08-03 LAB
CULTURE: NORMAL
Lab: NORMAL
SPECIMEN DESCRIPTION: NORMAL
STATUS: NORMAL

## 2018-08-07 ENCOUNTER — ANESTHESIA EVENT (OUTPATIENT)
Dept: LABOR AND DELIVERY | Age: 23
End: 2018-08-07
Payer: COMMERCIAL

## 2018-08-07 ENCOUNTER — HOSPITAL ENCOUNTER (INPATIENT)
Age: 23
LOS: 3 days | Discharge: HOME | End: 2018-08-10
Attending: OBSTETRICS & GYNECOLOGY | Admitting: OBSTETRICS & GYNECOLOGY
Payer: COMMERCIAL

## 2018-08-07 ENCOUNTER — ANESTHESIA (OUTPATIENT)
Dept: LABOR AND DELIVERY | Age: 23
End: 2018-08-07
Payer: COMMERCIAL

## 2018-08-07 ENCOUNTER — ROUTINE PRENATAL (OUTPATIENT)
Dept: OBGYN | Age: 23
End: 2018-08-07

## 2018-08-07 VITALS — WEIGHT: 200.8 LBS | DIASTOLIC BLOOD PRESSURE: 98 MMHG | BODY MASS INDEX: 32.41 KG/M2 | SYSTOLIC BLOOD PRESSURE: 164 MMHG

## 2018-08-07 DIAGNOSIS — O16.3 HYPERTENSION AFFECTING PREGNANCY IN THIRD TRIMESTER: ICD-10-CM

## 2018-08-07 DIAGNOSIS — Z3A.36 36 WEEKS GESTATION OF PREGNANCY: Primary | ICD-10-CM

## 2018-08-07 DIAGNOSIS — Z34.83 ENCOUNTER FOR SUPERVISION OF OTHER NORMAL PREGNANCY IN THIRD TRIMESTER: ICD-10-CM

## 2018-08-07 LAB
-: ABNORMAL
ABSOLUTE EOS #: 0.05 K/UL (ref 0–0.44)
ABSOLUTE IMMATURE GRANULOCYTE: 0.06 K/UL (ref 0–0.3)
ABSOLUTE LYMPH #: 1.18 K/UL (ref 1.1–3.7)
ABSOLUTE MONO #: 0.72 K/UL (ref 0.1–1.2)
ALBUMIN SERPL-MCNC: 3.1 G/DL (ref 3.5–5.2)
ALBUMIN/GLOBULIN RATIO: 0.9 (ref 1–2.5)
ALP BLD-CCNC: 316 U/L (ref 35–104)
ALT SERPL-CCNC: 9 U/L (ref 5–33)
AMORPHOUS: ABNORMAL
AMPHETAMINE SCREEN URINE: NEGATIVE
ANION GAP SERPL CALCULATED.3IONS-SCNC: 13 MMOL/L (ref 9–17)
AST SERPL-CCNC: 16 U/L
BACTERIA: ABNORMAL
BARBITURATE SCREEN URINE: NEGATIVE
BASOPHILS # BLD: 0 % (ref 0–2)
BASOPHILS ABSOLUTE: <0.03 K/UL (ref 0–0.2)
BENZODIAZEPINE SCREEN, URINE: NEGATIVE
BILIRUB SERPL-MCNC: 0.28 MG/DL (ref 0.3–1.2)
BILIRUBIN URINE: NEGATIVE
BUN BLDV-MCNC: 8 MG/DL (ref 6–20)
BUN/CREAT BLD: 11 (ref 9–20)
BUPRENORPHINE URINE: NEGATIVE
CALCIUM SERPL-MCNC: 9.8 MG/DL (ref 8.6–10.4)
CANNABINOID SCREEN URINE: NEGATIVE
CASTS UA: ABNORMAL /LPF
CHLORIDE BLD-SCNC: 105 MMOL/L (ref 98–107)
CO2: 19 MMOL/L (ref 20–31)
COCAINE METABOLITE, URINE: NEGATIVE
COLOR: YELLOW
COMMENT UA: ABNORMAL
CREAT SERPL-MCNC: 0.7 MG/DL (ref 0.5–0.9)
CRYSTALS, UA: ABNORMAL /HPF
DIFFERENTIAL TYPE: ABNORMAL
EOSINOPHILS RELATIVE PERCENT: 1 % (ref 1–4)
EPITHELIAL CELLS UA: ABNORMAL /HPF (ref 0–25)
FIBRINOGEN: 392 MG/DL (ref 185–451)
GFR AFRICAN AMERICAN: >60 ML/MIN
GFR NON-AFRICAN AMERICAN: >60 ML/MIN
GFR SERPL CREATININE-BSD FRML MDRD: ABNORMAL ML/MIN/{1.73_M2}
GFR SERPL CREATININE-BSD FRML MDRD: ABNORMAL ML/MIN/{1.73_M2}
GLUCOSE BLD-MCNC: 72 MG/DL (ref 70–99)
GLUCOSE URINE: NEGATIVE
HCT VFR BLD CALC: 31.6 % (ref 36.3–47.1)
HEMOGLOBIN: 10.1 G/DL (ref 11.9–15.1)
IMMATURE GRANULOCYTES: 1 %
INR BLD: 0.9 (ref 0.9–1.2)
KETONES, URINE: NEGATIVE
LACTATE DEHYDROGENASE: 208 U/L (ref 135–214)
LEUKOCYTE ESTERASE, URINE: ABNORMAL
LYMPHOCYTES # BLD: 17 % (ref 24–43)
MCH RBC QN AUTO: 25.1 PG (ref 25.2–33.5)
MCHC RBC AUTO-ENTMCNC: 32 G/DL (ref 28.4–34.8)
MCV RBC AUTO: 78.4 FL (ref 82.6–102.9)
MDMA URINE: NORMAL
METHADONE SCREEN, URINE: NEGATIVE
METHAMPHETAMINE, URINE: NEGATIVE
MONOCYTES # BLD: 10 % (ref 3–12)
MUCUS: ABNORMAL
NITRITE, URINE: NEGATIVE
NRBC AUTOMATED: 0 PER 100 WBC
OPIATES, URINE: NEGATIVE
OTHER OBSERVATIONS UA: ABNORMAL
OXYCODONE SCREEN URINE: NEGATIVE
PARTIAL THROMBOPLASTIN TIME: 24.8 SEC (ref 23.2–34.4)
PDW BLD-RTO: 14.4 % (ref 11.8–14.4)
PH UA: 6 (ref 5–9)
PHENCYCLIDINE, URINE: NEGATIVE
PLATELET # BLD: 199 K/UL (ref 138–453)
PLATELET ESTIMATE: ABNORMAL
PMV BLD AUTO: 12 FL (ref 8.1–13.5)
POTASSIUM SERPL-SCNC: 4.5 MMOL/L (ref 3.7–5.3)
PROPOXYPHENE, URINE: NEGATIVE
PROTEIN UA: ABNORMAL
PROTHROMBIN TIME: 9.4 SEC (ref 9.7–12.2)
RBC # BLD: 4.03 M/UL (ref 3.95–5.11)
RBC # BLD: ABNORMAL 10*6/UL
RBC UA: ABNORMAL /HPF (ref 0–2)
RENAL EPITHELIAL, UA: ABNORMAL /HPF
SEG NEUTROPHILS: 71 % (ref 36–65)
SEGMENTED NEUTROPHILS ABSOLUTE COUNT: 4.98 K/UL (ref 1.5–8.1)
SODIUM BLD-SCNC: 137 MMOL/L (ref 135–144)
SPECIFIC GRAVITY UA: >1.03 (ref 1.01–1.02)
TEST INFORMATION: NORMAL
TOTAL PROTEIN: 6.4 G/DL (ref 6.4–8.3)
TRICHOMONAS: ABNORMAL
TRICYCLIC ANTIDEPRESSANTS, UR: NEGATIVE
TURBIDITY: ABNORMAL
URIC ACID: 6.5 MG/DL (ref 2.4–5.7)
URINE HGB: ABNORMAL
UROBILINOGEN, URINE: NORMAL
WBC # BLD: 7 K/UL (ref 3.5–11.3)
WBC # BLD: ABNORMAL 10*3/UL
WBC UA: ABNORMAL /HPF (ref 0–5)
YEAST: ABNORMAL

## 2018-08-07 PROCEDURE — 85610 PROTHROMBIN TIME: CPT

## 2018-08-07 PROCEDURE — 80306 DRUG TEST PRSMV INSTRMNT: CPT

## 2018-08-07 PROCEDURE — 85384 FIBRINOGEN ACTIVITY: CPT

## 2018-08-07 PROCEDURE — 80053 COMPREHEN METABOLIC PANEL: CPT

## 2018-08-07 PROCEDURE — 0502F SUBSEQUENT PRENATAL CARE: CPT | Performed by: ADVANCED PRACTICE MIDWIFE

## 2018-08-07 PROCEDURE — 6370000000 HC RX 637 (ALT 250 FOR IP): Performed by: ADVANCED PRACTICE MIDWIFE

## 2018-08-07 PROCEDURE — 1220000000 HC SEMI PRIVATE OB R&B

## 2018-08-07 PROCEDURE — 3700000025 ANESTHESIA EPIDURAL BLOCK: Performed by: NURSE ANESTHETIST, CERTIFIED REGISTERED

## 2018-08-07 PROCEDURE — 99024 POSTOP FOLLOW-UP VISIT: CPT | Performed by: ADVANCED PRACTICE MIDWIFE

## 2018-08-07 PROCEDURE — 3E033VJ INTRODUCTION OF OTHER HORMONE INTO PERIPHERAL VEIN, PERCUTANEOUS APPROACH: ICD-10-PCS | Performed by: ADVANCED PRACTICE MIDWIFE

## 2018-08-07 PROCEDURE — 36415 COLL VENOUS BLD VENIPUNCTURE: CPT

## 2018-08-07 PROCEDURE — 84550 ASSAY OF BLOOD/URIC ACID: CPT

## 2018-08-07 PROCEDURE — 83615 LACTATE (LD) (LDH) ENZYME: CPT

## 2018-08-07 PROCEDURE — 85730 THROMBOPLASTIN TIME PARTIAL: CPT

## 2018-08-07 PROCEDURE — 2580000003 HC RX 258: Performed by: ADVANCED PRACTICE MIDWIFE

## 2018-08-07 PROCEDURE — 85025 COMPLETE CBC W/AUTO DIFF WBC: CPT

## 2018-08-07 PROCEDURE — 81001 URINALYSIS AUTO W/SCOPE: CPT

## 2018-08-07 PROCEDURE — 6360000002 HC RX W HCPCS: Performed by: NURSE ANESTHETIST, CERTIFIED REGISTERED

## 2018-08-07 PROCEDURE — 6360000002 HC RX W HCPCS: Performed by: ADVANCED PRACTICE MIDWIFE

## 2018-08-07 PROCEDURE — 4A1HXCZ MONITORING OF PRODUCTS OF CONCEPTION, CARDIAC RATE, EXTERNAL APPROACH: ICD-10-PCS | Performed by: ADVANCED PRACTICE MIDWIFE

## 2018-08-07 PROCEDURE — 2500000003 HC RX 250 WO HCPCS: Performed by: NURSE ANESTHETIST, CERTIFIED REGISTERED

## 2018-08-07 RX ORDER — EPHEDRINE SULFATE 50 MG/ML
5 INJECTION, SOLUTION INTRAVENOUS EVERY 5 MIN PRN
Status: DISCONTINUED | OUTPATIENT
Start: 2018-08-07 | End: 2018-08-08

## 2018-08-07 RX ORDER — ONDANSETRON 2 MG/ML
4 INJECTION INTRAMUSCULAR; INTRAVENOUS EVERY 6 HOURS PRN
Status: DISCONTINUED | OUTPATIENT
Start: 2018-08-07 | End: 2018-08-07 | Stop reason: SDUPTHER

## 2018-08-07 RX ORDER — LABETALOL 100 MG/1
200 TABLET, FILM COATED ORAL EVERY 8 HOURS SCHEDULED
Status: DISCONTINUED | OUTPATIENT
Start: 2018-08-07 | End: 2018-08-07

## 2018-08-07 RX ORDER — SODIUM CHLORIDE 0.9 % (FLUSH) 0.9 %
10 SYRINGE (ML) INJECTION PRN
Status: DISCONTINUED | OUTPATIENT
Start: 2018-08-07 | End: 2018-08-08

## 2018-08-07 RX ORDER — MISOPROSTOL 100 UG/1
900 TABLET ORAL PRN
Status: DISCONTINUED | OUTPATIENT
Start: 2018-08-07 | End: 2018-08-08

## 2018-08-07 RX ORDER — ACETAMINOPHEN 325 MG/1
650 TABLET ORAL EVERY 4 HOURS PRN
Status: DISCONTINUED | OUTPATIENT
Start: 2018-08-07 | End: 2018-08-08

## 2018-08-07 RX ORDER — NALOXONE HYDROCHLORIDE 0.4 MG/ML
0.4 INJECTION, SOLUTION INTRAMUSCULAR; INTRAVENOUS; SUBCUTANEOUS PRN
Status: DISCONTINUED | OUTPATIENT
Start: 2018-08-07 | End: 2018-08-08

## 2018-08-07 RX ORDER — NALBUPHINE HCL 10 MG/ML
5 AMPUL (ML) INJECTION EVERY 4 HOURS PRN
Status: DISCONTINUED | OUTPATIENT
Start: 2018-08-07 | End: 2018-08-08

## 2018-08-07 RX ORDER — ROPIVACAINE HYDROCHLORIDE 2 MG/ML
INJECTION, SOLUTION EPIDURAL; INFILTRATION; PERINEURAL CONTINUOUS PRN
Status: DISCONTINUED | OUTPATIENT
Start: 2018-08-07 | End: 2018-08-08 | Stop reason: SDUPTHER

## 2018-08-07 RX ORDER — SODIUM CHLORIDE 0.9 % (FLUSH) 0.9 %
10 SYRINGE (ML) INJECTION EVERY 12 HOURS SCHEDULED
Status: DISCONTINUED | OUTPATIENT
Start: 2018-08-07 | End: 2018-08-08

## 2018-08-07 RX ORDER — ONDANSETRON 2 MG/ML
4 INJECTION INTRAMUSCULAR; INTRAVENOUS EVERY 6 HOURS PRN
Status: DISCONTINUED | OUTPATIENT
Start: 2018-08-07 | End: 2018-08-08

## 2018-08-07 RX ORDER — CARBOPROST TROMETHAMINE 250 UG/ML
250 INJECTION, SOLUTION INTRAMUSCULAR PRN
Status: DISCONTINUED | OUTPATIENT
Start: 2018-08-07 | End: 2018-08-08

## 2018-08-07 RX ORDER — SODIUM CHLORIDE, SODIUM LACTATE, POTASSIUM CHLORIDE, CALCIUM CHLORIDE 600; 310; 30; 20 MG/100ML; MG/100ML; MG/100ML; MG/100ML
INJECTION, SOLUTION INTRAVENOUS CONTINUOUS
Status: DISCONTINUED | OUTPATIENT
Start: 2018-08-07 | End: 2018-08-08

## 2018-08-07 RX ORDER — NALBUPHINE HCL 10 MG/ML
10 AMPUL (ML) INJECTION
Status: DISCONTINUED | OUTPATIENT
Start: 2018-08-07 | End: 2018-08-08

## 2018-08-07 RX ORDER — LIDOCAINE HYDROCHLORIDE AND EPINEPHRINE 20; 5 MG/ML; UG/ML
INJECTION, SOLUTION EPIDURAL; INFILTRATION; INTRACAUDAL; PERINEURAL PRN
Status: DISCONTINUED | OUTPATIENT
Start: 2018-08-07 | End: 2018-08-08 | Stop reason: SDUPTHER

## 2018-08-07 RX ORDER — LABETALOL 100 MG/1
200 TABLET, FILM COATED ORAL EVERY 8 HOURS SCHEDULED
Status: DISCONTINUED | OUTPATIENT
Start: 2018-08-08 | End: 2018-08-08

## 2018-08-07 RX ORDER — LIDOCAINE HYDROCHLORIDE 10 MG/ML
30 INJECTION, SOLUTION EPIDURAL; INFILTRATION; INTRACAUDAL; PERINEURAL PRN
Status: DISCONTINUED | OUTPATIENT
Start: 2018-08-07 | End: 2018-08-08

## 2018-08-07 RX ADMIN — Medication 1 MILLI-UNITS/MIN: at 16:48

## 2018-08-07 RX ADMIN — LIDOCAINE HYDROCHLORIDE,EPINEPHRINE BITARTRATE 3 ML: 20; .005 INJECTION, SOLUTION EPIDURAL; INFILTRATION; INTRACAUDAL; PERINEURAL at 23:24

## 2018-08-07 RX ADMIN — LABETALOL HYDROCHLORIDE 200 MG: 100 TABLET, FILM COATED ORAL at 16:51

## 2018-08-07 RX ADMIN — SODIUM CHLORIDE, POTASSIUM CHLORIDE, SODIUM LACTATE AND CALCIUM CHLORIDE: 600; 310; 30; 20 INJECTION, SOLUTION INTRAVENOUS at 16:43

## 2018-08-07 RX ADMIN — LIDOCAINE HYDROCHLORIDE,EPINEPHRINE BITARTRATE 4 ML: 20; .005 INJECTION, SOLUTION EPIDURAL; INFILTRATION; INTRACAUDAL; PERINEURAL at 23:32

## 2018-08-07 RX ADMIN — ROPIVACAINE HYDROCHLORIDE 10 ML/HR: 2 INJECTION, SOLUTION EPIDURAL; INFILTRATION at 23:34

## 2018-08-07 RX ADMIN — LIDOCAINE HYDROCHLORIDE,EPINEPHRINE BITARTRATE 3 ML: 20; .005 INJECTION, SOLUTION EPIDURAL; INFILTRATION; INTRACAUDAL; PERINEURAL at 23:28

## 2018-08-07 RX ADMIN — SODIUM CHLORIDE, POTASSIUM CHLORIDE, SODIUM LACTATE AND CALCIUM CHLORIDE: 600; 310; 30; 20 INJECTION, SOLUTION INTRAVENOUS at 23:18

## 2018-08-07 NOTE — H&P
Department of Obstetrics and Gynecology   Obstetrics History and Physical  H&P Admission Inpatient  Note        CHIEF COMPLAINT:  elevated blood pressure    HISTORY OF PRESENT ILLNESS:      The patient is a 21 y.o. female at 36w7d. OB History      Para Term  AB Living    2 0 0 0 1 0    SAB TAB Ectopic Molar Multiple Live Births    1 0 0   0          Obstetric Comments                Patient presents with a chief complaint as above and is being admitted for induction    Estimated Due Date: Estimated Date of Delivery: 18    PRENATAL CARE:    Complicated by: none    PAST OB HISTORY:  OB History      Para Term  AB Living    2 0 0 0 1 0    SAB TAB Ectopic Molar Multiple Live Births    1 0 0   0          Obstetric Comments                    Past Medical History:    History reviewed. No pertinent past medical history. Past Surgical History:    History reviewed. No pertinent surgical history. Allergies:  Patient has no known allergies. Social History:    Social History     Social History    Marital status: Single     Spouse name: N/A    Number of children: N/A    Years of education: N/A     Occupational History    Not on file.      Social History Main Topics    Smoking status: Never Smoker    Smokeless tobacco: Never Used    Alcohol use No    Drug use: No    Sexual activity: Yes     Partners: Male     Other Topics Concern    Not on file     Social History Narrative    No narrative on file     Family History:   Family History   Problem Relation Age of Onset    High Blood Pressure Mother     Diabetes Maternal Grandmother     Cancer Paternal Grandmother         lung    Diabetes Paternal Grandfather      Medications Prior to Admission:  Prescriptions Prior to Admission: Prenatal Vit-Fe Fumarate-FA (PNV PRENATAL PLUS MULTIVITAMIN) 27-1 MG TABS,   Prenatal MV-Min-Fe Fum-FA-DHA (PRENATAL 1) 30-0.975-200 MG CAPS, Take 1 tablet by mouth daily  ondansetron (ZOFRAN) 4 MG tablet, CO2 19 (L) 20 - 31 mmol/L    Anion Gap 13 9 - 17 mmol/L    Alkaline Phosphatase 316 (H) 35 - 104 U/L    ALT 9 5 - 33 U/L    AST 16 <32 U/L    Total Bilirubin 0.28 (L) 0.3 - 1.2 mg/dL    Total Protein 6.4 6.4 - 8.3 g/dL    Alb 3.1 (L) 3.5 - 5.2 g/dL    Albumin/Globulin Ratio 0.9 (L) 1.0 - 2.5    GFR Non-African American >60 >60 mL/min    GFR African American >60 >60 mL/min    GFR Comment          GFR Staging         Protime-INR   Result Value Ref Range    Protime 9.4 (L) 9.7 - 12.2 sec    INR 0.9 0.9 - 1.2   APTT   Result Value Ref Range    PTT 24.8 23.2 - 34.4 sec   Fibrinogen   Result Value Ref Range    Fibrinogen 392 185 - 451 mg/dL   Uric acid   Result Value Ref Range    Uric Acid 6.5 (H) 2.4 - 5.7 mg/dL   Lactate Dehydrogenase   Result Value Ref Range     135 - 214 U/L   Microscopic Urinalysis   Result Value Ref Range    -          WBC, UA 20 TO 50 0 - 5 /HPF    RBC, UA 10 TO 20 0 - 2 /HPF    Casts UA NOT REPORTED /LPF    Crystals UA CALCIUM OXALATE (A) NONE /HPF    Epithelial Cells UA 5 TO 10 0 - 25 /HPF    Renal Epithelial, Urine NOT REPORTED 0 /HPF    Bacteria, UA 3+ (A) NONE    Mucus, UA NOT REPORTED NONE    Trichomonas, UA NOT REPORTED NONE    Amorphous, UA NOT REPORTED NONE    Other Observations UA NOT REPORTED NREQ    Yeast, UA PRESENCE NOTED (A) NONE       ASSESSMENT AND PLAN:    Estimated length of stay: 2 midnights post vaginal delivery    Patient Active Hospital Problem List:   Hypertension affecting pregnancy in third trimester (8/7/2018)  36 weeks pregnant        Labor: Admit, anticipate normal delivery, routine labor orders  Fetus: Reassuring, Cat 1  GBS: No  Other: pitocin    Reviewed case with Dr. Surekha Cazares and plan induction elvin Siddiqi CNM,8/7/2018 4:17 PM

## 2018-08-07 NOTE — PROGRESS NOTES
Department of Obstetrics and Gynecology   Progress Note    Verbal consent obtained for induction due to hypertension with pt and   SUBJECTIVE:  Pt talking thru contractions at this point. We have initiated labetalol forBP.      OBJECTIVE:      Vitals:    08/07/18 1610 08/07/18 1624 08/07/18 1640 08/07/18 1656   BP: (!) 183/86 (!) 185/92 (!) 172/89 (!) 155/96   Pulse: 80 68 72 72   Resp:       Temp:       TempSrc:       Weight:       Height:           Fetal heart rate:       Baseline Heart Rate:  130        Accelerations:  present       Long Term Variability:  moderate       Decelerations:  absent         Contraction frequency: 1-3 minutes irritability    Membranes:  Intact attempted rupture with no fluid return  Will attempt later    Cervix:         Dilation:  2 cm         Effacement:  70         Station:  -2         Position:  mid             ASSESSMENT & PLAN:    Continue pitocin  Continue BP meds  Plan epidural for comfort

## 2018-08-08 PROCEDURE — 88307 TISSUE EXAM BY PATHOLOGIST: CPT

## 2018-08-08 PROCEDURE — 1220000000 HC SEMI PRIVATE OB R&B

## 2018-08-08 PROCEDURE — 59400 OBSTETRICAL CARE: CPT | Performed by: ADVANCED PRACTICE MIDWIFE

## 2018-08-08 PROCEDURE — 7200000001 HC VAGINAL DELIVERY

## 2018-08-08 PROCEDURE — 6370000000 HC RX 637 (ALT 250 FOR IP): Performed by: ADVANCED PRACTICE MIDWIFE

## 2018-08-08 PROCEDURE — 0KQM0ZZ REPAIR PERINEUM MUSCLE, OPEN APPROACH: ICD-10-PCS | Performed by: ADVANCED PRACTICE MIDWIFE

## 2018-08-08 PROCEDURE — 2580000003 HC RX 258: Performed by: ADVANCED PRACTICE MIDWIFE

## 2018-08-08 RX ORDER — DOCUSATE SODIUM 100 MG/1
100 CAPSULE, LIQUID FILLED ORAL 2 TIMES DAILY PRN
Status: DISCONTINUED | OUTPATIENT
Start: 2018-08-08 | End: 2018-08-10 | Stop reason: HOSPADM

## 2018-08-08 RX ORDER — ACETAMINOPHEN 325 MG/1
650 TABLET ORAL EVERY 4 HOURS PRN
Status: DISCONTINUED | OUTPATIENT
Start: 2018-08-08 | End: 2018-08-10 | Stop reason: HOSPADM

## 2018-08-08 RX ORDER — SODIUM CHLORIDE 0.9 % (FLUSH) 0.9 %
10 SYRINGE (ML) INJECTION PRN
Status: DISCONTINUED | OUTPATIENT
Start: 2018-08-08 | End: 2018-08-10 | Stop reason: HOSPADM

## 2018-08-08 RX ORDER — LABETALOL 100 MG/1
100 TABLET, FILM COATED ORAL EVERY 8 HOURS SCHEDULED
Status: DISCONTINUED | OUTPATIENT
Start: 2018-08-08 | End: 2018-08-09

## 2018-08-08 RX ORDER — LANOLIN 100 %
OINTMENT (GRAM) TOPICAL PRN
Status: DISCONTINUED | OUTPATIENT
Start: 2018-08-08 | End: 2018-08-10 | Stop reason: HOSPADM

## 2018-08-08 RX ORDER — IBUPROFEN 800 MG/1
800 TABLET ORAL EVERY 8 HOURS
Status: DISCONTINUED | OUTPATIENT
Start: 2018-08-08 | End: 2018-08-10 | Stop reason: HOSPADM

## 2018-08-08 RX ORDER — SODIUM CHLORIDE 0.9 % (FLUSH) 0.9 %
10 SYRINGE (ML) INJECTION EVERY 12 HOURS SCHEDULED
Status: DISCONTINUED | OUTPATIENT
Start: 2018-08-08 | End: 2018-08-10 | Stop reason: HOSPADM

## 2018-08-08 RX ADMIN — Medication: at 09:44

## 2018-08-08 RX ADMIN — LABETALOL HYDROCHLORIDE 100 MG: 100 TABLET, FILM COATED ORAL at 16:37

## 2018-08-08 RX ADMIN — WITCH HAZEL 100 EACH: 500 SOLUTION RECTAL; TOPICAL at 09:44

## 2018-08-08 RX ADMIN — LABETALOL HYDROCHLORIDE 200 MG: 100 TABLET, FILM COATED ORAL at 00:32

## 2018-08-08 RX ADMIN — IBUPROFEN 800 MG: 800 TABLET ORAL at 20:11

## 2018-08-08 RX ADMIN — SODIUM CHLORIDE, POTASSIUM CHLORIDE, SODIUM LACTATE AND CALCIUM CHLORIDE: 600; 310; 30; 20 INJECTION, SOLUTION INTRAVENOUS at 05:15

## 2018-08-08 RX ADMIN — LABETALOL HYDROCHLORIDE 200 MG: 100 TABLET, FILM COATED ORAL at 07:14

## 2018-08-08 RX ADMIN — IBUPROFEN 800 MG: 800 TABLET ORAL at 09:45

## 2018-08-08 RX ADMIN — BENZOCAINE AND LEVOMENTHOL: 200; 5 SPRAY TOPICAL at 09:44

## 2018-08-08 ASSESSMENT — PAIN SCALES - GENERAL
PAINLEVEL_OUTOF10: 3
PAINLEVEL_OUTOF10: 4

## 2018-08-08 NOTE — ANESTHESIA PRE PROCEDURE
Blocker         Neuro/Psych:   Negative Neuro/Psych ROS              GI/Hepatic/Renal: Neg GI/Hepatic/Renal ROS           ROS comment: Protein in urine. Liver enzymes normal .   Endo/Other: Negative Endo/Other ROS                    Abdominal:       Abdomen: soft. Vascular: negative vascular ROS. Anesthesia Plan      epidural     ASA 2     (Plan discussed with patient. Risks and benefits explained. Agreeable to epidural.  Discussed emergency okay with spinal/GA if needed)        Anesthetic plan and risks discussed with patient.                       Claudell Ingles, APRN - CRNA   8/7/2018

## 2018-08-08 NOTE — FLOWSHEET NOTE
08/08/18 1620   Vital Signs   Pulse 93   BP (!) 122/58   K. [facility] notified of BP. Orders received.

## 2018-08-08 NOTE — FLOWSHEET NOTE
Spoke with PARRIS Siddiqi CNM on telephone in regards to patient Pitocin level, ctx, pattern, SVE, and BPs.

## 2018-08-08 NOTE — FLOWSHEET NOTE
Notified PARRIS Siddiqi CNM of patient of being complete. Shaye Rivrea states on her way to unit for delivery.

## 2018-08-08 NOTE — FLOWSHEET NOTE
Pt educated on signs and symptoms of hypotension.  Pt encouraged to rise slowly from lying or sitting and to report feelings of dizziness to RN and ask for assistance as needed,

## 2018-08-08 NOTE — PLAN OF CARE
Problem: Fluid Volume - Imbalance:  Goal: Absence of imbalanced fluid volume signs and symptoms  Absence of imbalanced fluid volume signs and symptoms  Outcome: Ongoing    Goal: Absence of postpartum hemorrhage signs and symptoms  Absence of postpartum hemorrhage signs and symptoms  Outcome: Ongoing      Problem: Pain - Acute:  Goal: Pain level will decrease  Pain level will decrease  Outcome: Ongoing      Problem: Discharge Planning:  Goal: Discharged to appropriate level of care  Discharged to appropriate level of care  Outcome: Ongoing      Problem: Constipation:  Goal: Bowel elimination is within specified parameters  Bowel elimination is within specified parameters  Outcome: Ongoing      Problem: Infection - Risk of, Puerperal Infection:  Goal: Will show no infection signs and symptoms  Will show no infection signs and symptoms  Outcome: Ongoing      Problem: Mood - Altered:  Goal: Mood stable  Mood stable  Outcome: Ongoing

## 2018-08-08 NOTE — FLOWSHEET NOTE
Called PARRIS Siddiqi CNM with updates of most recent SVE, pit level at 23, BPs,  and patients pain level. Okay for patient to receive epidural. Provider states to leave Pitocin at max level until futher orders given.

## 2018-08-08 NOTE — L&D DELIVERY NOTE
Mother's Information    Labor Events     labor?:  No  Rupture type:  Spontaneous=SROM  Fluid color:  Bloody Show  Fluid odor:  None     Mother Delivery Information    Episiotomy:  None  Lacerations:  2nd  # of Repair Packets:  1  Vaginal Delivery Est. Blood Loss (mL):  250  Surgical or Additional Est. Blood Loss (mL):  0 (View Only):  Edit in Flowsheets   Combined Est. Blood Loss (mL):  250        Fernandez Phoenix [[id number]]    Events of Labor     labor?:  No   steroids?:  None  Cervical ripening date/time:     Antibiotics received during labor?:  No  Rupture date/time: 18   Rupture type:  Spontaneous=SROM  Fluid color:  Bloody Show  Fluid odor:  None  Induction:  Oxytocin  Indications for induction:  Hypertension  Labor complications:  None     Print Group Title    Labor onset date/time: 18   Dilation complete date/time:   18 EDT   Start pushin2018 0544   Decision time (emergent ):        Anesthesia    Method:  Epidural     Assisted Delivery Details    Forceps attempted?:  No  Vacuum extractor attempted?:  No     Document Additional Attempt       Document Additional Attempt             Shoulder Dystocia    Shoulder dystocia present?:  No  Add Second Maneuver  Add Third Maneuver  Add Fourth Maneuver  Add Fifth Maneuver  Add Sixth Maneuver  Add Seventh Maneuver  Add Eighth Maneuver  Add Ninth Maneuver     [place] Presentation    Presentation:  Vertex  Position:  Left  _:  Occiput  _:  Anterior     [place] Information     Changing the 's delivery date/time could affect patient care.:     Delivery date/time:   18 0716   Delivery type:  Vaginal, Spontaneous Delivery    Details:         Delivery Providers    Delivering clinician:  CRISELDA Sarkar CNM   Provider Role    Adams Garcia RN Registered Nurse    Jose Alvarado RN Registered Nurse      Cord    Vessels:  3 Vessels  Complications:  None  Delayed Cord Clamping?:

## 2018-08-09 PROCEDURE — 6370000000 HC RX 637 (ALT 250 FOR IP): Performed by: ADVANCED PRACTICE MIDWIFE

## 2018-08-09 PROCEDURE — 1220000000 HC SEMI PRIVATE OB R&B

## 2018-08-09 RX ORDER — LABETALOL 100 MG/1
100 TABLET, FILM COATED ORAL 2 TIMES DAILY
Status: DISCONTINUED | OUTPATIENT
Start: 2018-08-09 | End: 2018-08-10 | Stop reason: HOSPADM

## 2018-08-09 RX ADMIN — LABETALOL HYDROCHLORIDE 100 MG: 100 TABLET, FILM COATED ORAL at 21:08

## 2018-08-09 RX ADMIN — IBUPROFEN 800 MG: 800 TABLET ORAL at 21:08

## 2018-08-09 RX ADMIN — BENZOCAINE AND LEVOMENTHOL: 200; 5 SPRAY TOPICAL at 21:08

## 2018-08-09 RX ADMIN — LABETALOL HYDROCHLORIDE 100 MG: 100 TABLET, FILM COATED ORAL at 00:49

## 2018-08-09 RX ADMIN — LABETALOL HYDROCHLORIDE 100 MG: 100 TABLET, FILM COATED ORAL at 08:12

## 2018-08-09 ASSESSMENT — PAIN SCALES - GENERAL: PAINLEVEL_OUTOF10: 4

## 2018-08-10 VITALS
OXYGEN SATURATION: 99 % | WEIGHT: 200 LBS | DIASTOLIC BLOOD PRESSURE: 79 MMHG | HEIGHT: 62 IN | TEMPERATURE: 97.7 F | SYSTOLIC BLOOD PRESSURE: 143 MMHG | BODY MASS INDEX: 36.8 KG/M2 | RESPIRATION RATE: 18 BRPM | HEART RATE: 82 BPM

## 2018-08-10 LAB — SURGICAL PATHOLOGY REPORT: NORMAL

## 2018-08-10 PROCEDURE — 6370000000 HC RX 637 (ALT 250 FOR IP): Performed by: ADVANCED PRACTICE MIDWIFE

## 2018-08-10 PROCEDURE — 99024 POSTOP FOLLOW-UP VISIT: CPT | Performed by: ADVANCED PRACTICE MIDWIFE

## 2018-08-10 RX ORDER — LABETALOL 100 MG/1
100 TABLET, FILM COATED ORAL 2 TIMES DAILY
Qty: 60 TABLET | Refills: 3 | Status: SHIPPED | OUTPATIENT
Start: 2018-08-10 | End: 2018-08-23 | Stop reason: DRUGHIGH

## 2018-08-10 RX ORDER — IBUPROFEN 800 MG/1
800 TABLET ORAL EVERY 8 HOURS
Qty: 60 TABLET | Refills: 0 | Status: SHIPPED | OUTPATIENT
Start: 2018-08-10

## 2018-08-10 RX ADMIN — LABETALOL HYDROCHLORIDE 100 MG: 100 TABLET, FILM COATED ORAL at 09:13

## 2018-08-10 NOTE — PROGRESS NOTES
Department of Obstetrics and Gynecology  Labor and Delivery       Post Partum Progress Note             SUBJECTIVE:  Pt here today states she is doing well and is ready to go home    OBJECTIVE:      Vitals:  BP (!) 143/79   Pulse 82   Temp 97.7 °F (36.5 °C) (Oral)   Resp 18   Ht 5' 2\" (1.575 m)   Wt 200 lb (90.7 kg)   LMP 2017   SpO2 99%   Breastfeeding?  Unknown   BMI 36.58 kg/m²   Patient Vitals for the past 24 hrs:   BP Temp Temp src Pulse Resp   08/10/18 0720 (!) 143/79 97.7 °F (36.5 °C) Oral 82 18   18 2339 (!) 140/70 98 °F (36.7 °C) Temporal 103 16   18 2101 (!) 145/85 - - 98 -   18 1729 139/80 - - 93 -   18 1728 (!) 144/85 - - 98 -   18 1529 (!) 141/74 98 °F (36.7 °C) Oral 94 18   18 1444 (!) 140/75 - - 97 -         ABDOMEN: normal shape, position and consistency  GENITAL/URINARY:  External Genitalia:  General appearance; normal, Hair distribution; normal, Lesions absent  Uterus:  Size normal, Contour normal, Position normal  Breast:normal appearance, no masses or tenderness  Cor: RRR no Murmurs  Pulmonary: clear to auscultation anterior and posterior  Extremities: no Clubbing cyanosis or ecchymosis    DATA:          ASSESSMENT :      Patient Active Hospital Problem List:   Hypertension affecting pregnancy in third trimester (2018)      37 weeks gestation of pregnancy ()      (normal spontaneous vaginal delivery) ()            Plan:  Discharge home

## 2018-08-10 NOTE — DISCHARGE SUMMARY
pad  Place rectally as needed.                   Discharge Date: 8/10/2018    Discharged to: Home         Plan:   Follow up in 1 week(s) for blood pressure check

## 2018-08-16 ENCOUNTER — POSTPARTUM VISIT (OUTPATIENT)
Dept: OBGYN | Age: 23
End: 2018-08-16

## 2018-08-16 VITALS
HEIGHT: 62 IN | DIASTOLIC BLOOD PRESSURE: 92 MMHG | BODY MASS INDEX: 32.72 KG/M2 | SYSTOLIC BLOOD PRESSURE: 144 MMHG | WEIGHT: 177.8 LBS

## 2018-08-16 DIAGNOSIS — O16.3 HYPERTENSION AFFECTING PREGNANCY IN THIRD TRIMESTER: Primary | ICD-10-CM

## 2018-08-16 PROCEDURE — 99024 POSTOP FOLLOW-UP VISIT: CPT | Performed by: ADVANCED PRACTICE MIDWIFE

## 2018-08-16 NOTE — PROGRESS NOTES
PROBLEM VISIT     Date of service: 2018    Zach Cordero  Is a 21 y.o.  female    PT's PCP is: Monique Phipps DO     : 1995                                             Subjective:       Patient's last menstrual period was 2017. OB History    Para Term  AB Living   2 1 1 0 1 1   SAB TAB Ectopic Molar Multiple Live Births   1 0 0   0 1      # Outcome Date GA Lbr Oleksandr/2nd Weight Sex Delivery Anes PTL Lv   2 Term 18 37w0d 11:38 / :59 7 lb 10 oz (3.458 kg) M Vag-Spont EPI N PAGE   1 SAB 17              Obstetric Comments                   History   Smoking Status    Never Smoker   Smokeless Tobacco    Never Used        History   Alcohol Use No       History   Sexual Activity    Sexual activity: Yes    Partners: Male       Allergies: Patient has no known allergies. Chief Complaint   Patient presents with    Postpartum Care     1 week PP- BP check. patient states she is doing well. Last Yearly:  18    Last pap: 18    Last HPV: n/a    PE:  Vital Signs  Blood pressure (!) 144/92, height 5' 2\" (1.575 m), weight 177 lb 12.8 oz (80.6 kg), last menstrual period 2017, currently breastfeeding. Labs:    No results found for this visit on 18. NURSE: Lucas SARAVIA    HPI: pt here today states she is doing well. No headaches blurred vision     Yes  PT denies fever, chills, nausea and vomiting                                 Assessment and Plan          Diagnosis Orders   1. Hypertension affecting pregnancy in third trimester     2. Hypertension, postpartum condition or complication         no change in meds pt to continue twice a day meds      I am having Ms. Dariana Steele maintain her PNV PRENATAL PLUS MULTIVITAMIN, ibuprofen, labetalol, benzocaine-menthol, and witch hazel-glycerin. Return in about 1 week (around 2018) for PP BP CHECK.     She was also counseled on her preventative health maintenance recommendations and follow-up. There are no Patient Instructions on file for this visit.     Adelaide Pavon 12:40 PM

## 2018-08-23 ENCOUNTER — POSTPARTUM VISIT (OUTPATIENT)
Dept: OBGYN | Age: 23
End: 2018-08-23
Payer: COMMERCIAL

## 2018-08-23 VITALS
WEIGHT: 173 LBS | HEIGHT: 62 IN | SYSTOLIC BLOOD PRESSURE: 138 MMHG | DIASTOLIC BLOOD PRESSURE: 74 MMHG | BODY MASS INDEX: 31.83 KG/M2

## 2018-08-23 PROBLEM — Z04.1 MOTOR VEHICLE ACCIDENT WITH NO SIGNIFICANT INJURY: Status: ACTIVE | Noted: 2018-05-18

## 2018-08-23 PROBLEM — Z3A.25 25 WEEKS GESTATION OF PREGNANCY: Status: ACTIVE | Noted: 2018-05-18

## 2018-08-23 PROCEDURE — 99212 OFFICE O/P EST SF 10 MIN: CPT | Performed by: ADVANCED PRACTICE MIDWIFE

## 2018-08-23 RX ORDER — LABETALOL 100 MG/1
100 TABLET, FILM COATED ORAL DAILY
Qty: 60 TABLET | Refills: 3 | Status: SHIPPED
Start: 2018-08-23 | End: 2018-08-27 | Stop reason: ALTCHOICE

## 2018-08-27 ENCOUNTER — POSTPARTUM VISIT (OUTPATIENT)
Dept: OBGYN | Age: 23
End: 2018-08-27
Payer: COMMERCIAL

## 2018-08-27 VITALS
HEIGHT: 62 IN | DIASTOLIC BLOOD PRESSURE: 82 MMHG | WEIGHT: 171 LBS | BODY MASS INDEX: 31.47 KG/M2 | SYSTOLIC BLOOD PRESSURE: 138 MMHG

## 2018-08-27 PROCEDURE — 99213 OFFICE O/P EST LOW 20 MIN: CPT | Performed by: ADVANCED PRACTICE MIDWIFE

## 2018-08-27 NOTE — PROGRESS NOTES
PROBLEM VISIT     Date of service: 2018    Charol Hatchet  Is a 21 y.o.  female    PT's PCP is: Lani Demarco DO     : 1995                                             Subjective:       No LMP recorded. OB History    Para Term  AB Living   2 1 1 0 1 1   SAB TAB Ectopic Molar Multiple Live Births   1 0 0   0 1      # Outcome Date GA Lbr Oleksandr/2nd Weight Sex Delivery Anes PTL Lv   2 Term 18 37w0d 11:38 / :59 7 lb 10 oz (3.458 kg) M Vag-Spont EPI N PAGE   1 SAB 17              Obstetric Comments                   History   Smoking Status    Never Smoker   Smokeless Tobacco    Never Used        History   Alcohol Use No       History   Sexual Activity    Sexual activity: Yes    Partners: Male       Allergies: Patient has no known allergies. Chief Complaint   Patient presents with    Postpartum Care     BP check- been feeling pretty well. Last Yearly:  18    Last pap: 18    Last HPV: n/a    PE:  Vital Signs  Blood pressure 138/82, height 5' 2\" (1.575 m), weight 171 lb (77.6 kg), currently breastfeeding. Labs:    No results found for this visit on 18. NURSE: Lucas SARAVIA    HPI: pt here today for BP check post delivery/ pt is feeling much better since she is down to one dose    Yes  PT denies fever, chills, nausea and vomiting                                 Assessment and Plan          Diagnosis Orders   1. Hypertension, postpartum condition or complication               I have discontinued Ms. Rich's labetalol. I am also having her maintain her PNV PRENATAL PLUS MULTIVITAMIN and ibuprofen. Return in about 1 week (around 9/3/2018) for BP check. She was also counseled on her preventative health maintenance recommendations and follow-up. There are no Patient Instructions on file for this visit.     Kimberly Siddiqi,2018 12:27 PM

## 2018-09-04 ENCOUNTER — POSTPARTUM VISIT (OUTPATIENT)
Dept: OBGYN | Age: 23
End: 2018-09-04
Payer: COMMERCIAL

## 2018-09-04 VITALS — WEIGHT: 171.2 LBS | SYSTOLIC BLOOD PRESSURE: 140 MMHG | BODY MASS INDEX: 31.31 KG/M2 | DIASTOLIC BLOOD PRESSURE: 82 MMHG

## 2018-09-04 PROCEDURE — 99211 OFF/OP EST MAY X REQ PHY/QHP: CPT | Performed by: ADVANCED PRACTICE MIDWIFE

## 2018-09-04 NOTE — PROGRESS NOTES
PROBLEM VISIT     Date of service: 2018    Blessing Lee  Is a 21 y.o.  female    PT's PCP is: Albert Clements DO     : 1995                                             Subjective:       No LMP recorded. OB History    Para Term  AB Living   2 1 1 0 1 1   SAB TAB Ectopic Molar Multiple Live Births   1 0 0   0 1      # Outcome Date GA Lbr Oleksandr/2nd Weight Sex Delivery Anes PTL Lv   2 Term 18 37w0d 11:38 / :59 7 lb 10 oz (3.458 kg) M Vag-Spont EPI N PAGE   1 SAB 17              Obstetric Comments                   History   Smoking Status    Never Smoker   Smokeless Tobacco    Never Used        History   Alcohol Use No       History   Sexual Activity    Sexual activity: Yes    Partners: Male       Allergies: Patient has no known allergies. Chief Complaint   Patient presents with    Postpartum Care     BP check- no complaints. Last Yearly:  18    Last pap: 18    Last HPV: n/a    PE:  Vital Signs  Blood pressure (!) 140/82, weight 171 lb 3.2 oz (77.7 kg), currently breastfeeding. Labs:    No results found for this visit on 18. NURSE: Lucas SARAVIA    HPI: pt feels good                              Assessment and Plan          Diagnosis Orders   1. Hypertension, postpartum condition or complication               I am having Ms. Haris Mustafa maintain her PNV PRENATAL PLUS MULTIVITAMIN and ibuprofen. No Follow-up on file. She was also counseled on her preventative health maintenance recommendations and follow-up. There are no Patient Instructions on file for this visit.     Dick Siddiqi,2018 11:40 AM

## 2018-09-18 ENCOUNTER — POSTPARTUM VISIT (OUTPATIENT)
Dept: OBGYN | Age: 23
End: 2018-09-18

## 2018-09-18 VITALS
WEIGHT: 172 LBS | BODY MASS INDEX: 31.65 KG/M2 | SYSTOLIC BLOOD PRESSURE: 134 MMHG | HEIGHT: 62 IN | DIASTOLIC BLOOD PRESSURE: 76 MMHG

## 2018-09-18 DIAGNOSIS — O92.4 DECREASED MILK PRODUCTION: ICD-10-CM

## 2018-09-18 LAB — HGB, POC: 11.4

## 2018-09-18 PROCEDURE — 0503F POSTPARTUM CARE VISIT: CPT | Performed by: ADVANCED PRACTICE MIDWIFE

## 2018-09-18 RX ORDER — METOCLOPRAMIDE 10 MG/1
10 TABLET ORAL CONTINUOUS
Qty: 27 TABLET | Refills: 0 | Status: SHIPPED | OUTPATIENT
Start: 2018-09-18

## 2018-09-18 NOTE — PROGRESS NOTES
sleeping:yes for the most part    How is baby eating: good    How did first pediatrician visit go: good    EPPDS:2    Results for orders placed or performed in visit on 09/18/18   POCT hemoglobin   Result Value Ref Range    Hemoglobin 11.4          Nurse: ROSCOE    HPI:  PT presents for Post partum exam Follow up in 6 week(s) after delivery. Pt is doing well but feels like baby is not content while eating            Assessment and Plan        Diagnosis Orders   1. Routine postpartum follow-up  POCT hemoglobin   2. Decreased milk production  metoclopramide (REGLAN) 10 MG tablet       reviewed baby going through a growth spurt and needs to eat more for breast stimulation  reglan reviewed  Pumping 10 minutes post feed for extra stimulation      I am having Ms. Esdras Everett start on metoclopramide. I am also having her maintain her PNV PRENATAL PLUS MULTIVITAMIN and ibuprofen. Return in about 5 months (around 2/4/2019) for yearly. She was also counseled on her preventative health maintenance recommendations and follow-up. There are no Patient Instructions on file for this visit.     Cinthia Guadarrama 11:29 AM

## 2018-10-09 ENCOUNTER — TELEPHONE (OUTPATIENT)
Dept: OBGYN | Age: 23
End: 2018-10-09

## 2018-10-12 ENCOUNTER — TELEPHONE (OUTPATIENT)
Dept: OBGYN | Age: 23
End: 2018-10-12

## 2018-10-12 DIAGNOSIS — B37.89 CANDIDIASIS OF BREAST: Primary | ICD-10-CM

## 2018-10-12 RX ORDER — FLUCONAZOLE 150 MG/1
150 TABLET ORAL DAILY
Qty: 15 TABLET | Refills: 0 | Status: SHIPPED | OUTPATIENT
Start: 2018-10-12

## 2019-07-25 ENCOUNTER — OFFICE VISIT (OUTPATIENT)
Dept: OBGYN | Age: 24
End: 2019-07-25
Payer: OTHER GOVERNMENT

## 2019-07-25 VITALS
SYSTOLIC BLOOD PRESSURE: 132 MMHG | WEIGHT: 146 LBS | BODY MASS INDEX: 26.87 KG/M2 | HEIGHT: 62 IN | DIASTOLIC BLOOD PRESSURE: 82 MMHG

## 2019-07-25 DIAGNOSIS — N61.0 ACUTE MASTITIS OF RIGHT BREAST: Primary | ICD-10-CM

## 2019-07-25 PROCEDURE — 99213 OFFICE O/P EST LOW 20 MIN: CPT | Performed by: ADVANCED PRACTICE MIDWIFE

## 2019-07-25 RX ORDER — IBUPROFEN 600 MG/1
600 TABLET ORAL EVERY 6 HOURS PRN
Qty: 30 TABLET | Refills: 3 | Status: SHIPPED | OUTPATIENT
Start: 2019-07-25

## 2025-06-12 ENCOUNTER — HOSPITAL ENCOUNTER (EMERGENCY)
Age: 30
Discharge: HOME | End: 2025-06-12
Attending: EMERGENCY MEDICINE
Payer: COMMERCIAL

## 2025-06-12 VITALS
WEIGHT: 150 LBS | OXYGEN SATURATION: 99 % | HEART RATE: 73 BPM | BODY MASS INDEX: 27.6 KG/M2 | SYSTOLIC BLOOD PRESSURE: 163 MMHG | DIASTOLIC BLOOD PRESSURE: 90 MMHG | HEIGHT: 62 IN | RESPIRATION RATE: 18 BRPM | TEMPERATURE: 98.2 F
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
## 2025-06-12 PROCEDURE — 90715 TDAP VACCINE 7 YRS/> IM: CPT | Performed by: EMERGENCY MEDICINE

## 2025-06-12 PROCEDURE — 99284 EMERGENCY DEPT VISIT MOD MDM: CPT

## 2025-06-12 PROCEDURE — 6360000002 HC RX W HCPCS: Performed by: EMERGENCY MEDICINE

## 2025-06-12 PROCEDURE — 6370000000 HC RX 637 (ALT 250 FOR IP): Performed by: EMERGENCY MEDICINE

## 2025-06-12 PROCEDURE — 90471 IMMUNIZATION ADMIN: CPT | Performed by: EMERGENCY MEDICINE

## 2025-06-12 RX ORDER — BACITRACIN ZINC AND POLYMYXIN B SULFATE 500; 1000 [USP'U]/G; [USP'U]/G
OINTMENT TOPICAL
Qty: 15 G | Refills: 1 | Status: SHIPPED | OUTPATIENT
Start: 2025-06-12 | End: 2025-06-19

## 2025-06-12 RX ORDER — BACITRACIN ZINC 500 [USP'U]/G
OINTMENT TOPICAL ONCE
Status: COMPLETED | OUTPATIENT
Start: 2025-06-12 | End: 2025-06-12

## 2025-06-12 RX ADMIN — TETANUS TOXOID, REDUCED DIPHTHERIA TOXOID AND ACELLULAR PERTUSSIS VACCINE, ADSORBED 0.5 ML: 5; 2.5; 8; 8; 2.5 SUSPENSION INTRAMUSCULAR at 11:22

## 2025-06-12 RX ADMIN — AMOXICILLIN AND CLAVULANATE POTASSIUM 1 TABLET: 875; 125 TABLET, FILM COATED ORAL at 11:21

## 2025-06-12 RX ADMIN — BACITRACIN ZINC: 500 OINTMENT TOPICAL at 11:21

## 2025-06-12 ASSESSMENT — PAIN DESCRIPTION - ORIENTATION: ORIENTATION: LEFT

## 2025-06-12 ASSESSMENT — LIFESTYLE VARIABLES
HOW MANY STANDARD DRINKS CONTAINING ALCOHOL DO YOU HAVE ON A TYPICAL DAY: PATIENT DOES NOT DRINK
HOW OFTEN DO YOU HAVE A DRINK CONTAINING ALCOHOL: NEVER

## 2025-06-12 ASSESSMENT — PAIN DESCRIPTION - DESCRIPTORS: DESCRIPTORS: SORE

## 2025-06-12 ASSESSMENT — PAIN DESCRIPTION - PAIN TYPE: TYPE: ACUTE PAIN

## 2025-06-12 ASSESSMENT — PAIN - FUNCTIONAL ASSESSMENT
PAIN_FUNCTIONAL_ASSESSMENT: ACTIVITIES ARE NOT PREVENTED
PAIN_FUNCTIONAL_ASSESSMENT: 0-10

## 2025-06-12 ASSESSMENT — PAIN SCALES - GENERAL: PAINLEVEL_OUTOF10: 3

## 2025-06-12 ASSESSMENT — PAIN DESCRIPTION - LOCATION: LOCATION: FACE

## 2025-06-12 NOTE — ED PROVIDER NOTES
[facility]  EMERGENCY DEPARTMENT ENCOUNTER      Pt Name: Christin Rich  MRN: [id number]  Birthdate 1995  Date of evaluation: 6/12/2025  Provider: Pool Weaver MD  Time Note started  10:46 AM EDT  6/12/25           NURSING NOTES REVIEWED     Pt evaluated in a timely manner      CURRENT MEDICATIONS       Discharge Medication List as of 6/12/2025 11:27 AM        CONTINUE these medications which have NOT CHANGED    Details   !! ibuprofen (ADVIL;MOTRIN) 600 MG tablet Take 1 tablet by mouth every 6 hours as needed for Pain, Disp-30 tablet, R-3Normal      fluconazole (DIFLUCAN) 150 MG tablet Take 1 tablet by mouth daily 300 mg first day then 150 each additional day for 14 days, Disp-15 tablet, R-0Normal      metoclopramide (REGLAN) 10 MG tablet Take 1 tablet by mouth continuous one tablet 3 times a day for 7 days then two tablets a day for 2 days  Then one tablet a day for 2 days, Disp-27 tablet, R-0Normal      !! ibuprofen (ADVIL;MOTRIN) 800 MG tablet Take 1 tablet by mouth every 8 hours, Disp-60 tablet, R-0Normal      Prenatal Vit-Fe Fumarate-FA (PNV PRENATAL PLUS MULTIVITAMIN) 27-1 MG TABS Historical Med       !! - Potential duplicate medications found. Please discuss with provider.          ALLERGIES     Patient has no known allergies.    FAMILY HISTORY       Family History   Problem Relation Age of Onset    High Blood Pressure Mother     Diabetes Maternal Grandmother     Cancer Paternal Grandmother         lung    Diabetes Paternal Grandfather           SOCIAL HISTORY       Social History     Socioeconomic History    Marital status:      Spouse name: None    Number of children: None    Years of education: None    Highest education level: None   Tobacco Use    Smoking status: Never    Smokeless tobacco: Never   Vaping Use    Vaping status: Never Used   Substance and Sexual Activity    Alcohol use: No     Alcohol/week: 0.0 standard drinks of alcohol    Drug use: No    Sexual  minutes, excluding separately reportable procedures.  There was a high probability of clinically significant/life threatening deterioration in the patient's condition which required my urgent intervention.             PROCEDURES:    Procedures       ED BEDSIDE ULTRASOUND:   Performed by ED Physician - none      FINAL IMPRESSION     1. Dog bite, initial encounter          DISPOSITION/PLAN   DISPOSITION Decision To Discharge 06/12/2025 11:39:14 AM      PATIENT REFERRED TO:  [facility] [employer] [facility] [employer] [place] [address]  [phone]  Call in 1 day        DISCHARGE MEDICATIONS:  Discharge Medication List as of 6/12/2025 11:27 AM        START taking these medications    Details   bacitracin-polymyxin b (POLYSPORIN) [id number] UNIT/GM ointment Apply topically 2 times daily., Disp-15 g, R-1, Normal      amoxicillin-clavulanate (AUGMENTIN) 875-125 MG per tablet Take 1 tablet by mouth 2 times daily for 7 days, Disp-14 tablet, R-0Normal           Controlled Substances Monitoring:          No data to display                (Please note that portions of this note were completed with a voice recognition program.  Efforts were made to edit the dictations but occasionally words are mis-transcribed.)    Pool Weaver MD (electronically signed)  Attending Emergency Physician           Pool Weaver MD  06/14/25 [id number]

## 2025-06-12 NOTE — DISCHARGE INSTRUCTIONS
You will be prescribed Augmentin an antibiotic    You may also reapply bacitracin ointment to the dog bite region twice a day    You have been updated on your tetanus immunizations    You are asked to follow-up with  [facility] in the next 24 hours